# Patient Record
Sex: MALE | Race: BLACK OR AFRICAN AMERICAN | NOT HISPANIC OR LATINO | Employment: FULL TIME | URBAN - METROPOLITAN AREA
[De-identification: names, ages, dates, MRNs, and addresses within clinical notes are randomized per-mention and may not be internally consistent; named-entity substitution may affect disease eponyms.]

---

## 2023-09-07 LAB
ALANINE AMINOTRANSFERASE (SGPT) (U/L) IN SER/PLAS: 18 U/L (ref 10–52)
ALBUMIN (G/DL) IN SER/PLAS: 4.1 G/DL (ref 3.4–5)
ALKALINE PHOSPHATASE (U/L) IN SER/PLAS: 53 U/L (ref 33–120)
ANION GAP IN SER/PLAS: 9 MMOL/L (ref 10–20)
ASPARTATE AMINOTRANSFERASE (SGOT) (U/L) IN SER/PLAS: 26 U/L (ref 9–39)
BASOPHILS (10*3/UL) IN BLOOD BY AUTOMATED COUNT: 0.02 X10E9/L (ref 0–0.1)
BASOPHILS/100 LEUKOCYTES IN BLOOD BY AUTOMATED COUNT: 0.7 % (ref 0–2)
BILIRUBIN TOTAL (MG/DL) IN SER/PLAS: 0.6 MG/DL (ref 0–1.2)
CALCIUM (MG/DL) IN SER/PLAS: 9.5 MG/DL (ref 8.6–10.3)
CARBON DIOXIDE, TOTAL (MMOL/L) IN SER/PLAS: 30 MMOL/L (ref 21–32)
CHLORIDE (MMOL/L) IN SER/PLAS: 105 MMOL/L (ref 98–107)
CHOLESTEROL (MG/DL) IN SER/PLAS: 178 MG/DL (ref 0–199)
CHOLESTEROL IN HDL (MG/DL) IN SER/PLAS: 53.6 MG/DL
CHOLESTEROL/HDL RATIO: 3.3
CREATININE (MG/DL) IN SER/PLAS: 1.13 MG/DL (ref 0.5–1.3)
EOSINOPHILS (10*3/UL) IN BLOOD BY AUTOMATED COUNT: 0.03 X10E9/L (ref 0–0.7)
EOSINOPHILS/100 LEUKOCYTES IN BLOOD BY AUTOMATED COUNT: 1.1 % (ref 0–6)
ERYTHROCYTE DISTRIBUTION WIDTH (RATIO) BY AUTOMATED COUNT: 14.2 % (ref 11.5–14.5)
ERYTHROCYTE MEAN CORPUSCULAR HEMOGLOBIN CONCENTRATION (G/DL) BY AUTOMATED: 33.3 G/DL (ref 32–36)
ERYTHROCYTE MEAN CORPUSCULAR VOLUME (FL) BY AUTOMATED COUNT: 85 FL (ref 80–100)
ERYTHROCYTES (10*6/UL) IN BLOOD BY AUTOMATED COUNT: 5.06 X10E12/L (ref 4.5–5.9)
GFR MALE: 84 ML/MIN/1.73M2
GLUCOSE (MG/DL) IN SER/PLAS: 81 MG/DL (ref 74–99)
HEMATOCRIT (%) IN BLOOD BY AUTOMATED COUNT: 43 % (ref 41–52)
HEMOGLOBIN (G/DL) IN BLOOD: 14.3 G/DL (ref 13.5–17.5)
IMMATURE GRANULOCYTES/100 LEUKOCYTES IN BLOOD BY AUTOMATED COUNT: 0.4 % (ref 0–0.9)
LDL: 114 MG/DL (ref 0–99)
LEUKOCYTES (10*3/UL) IN BLOOD BY AUTOMATED COUNT: 2.8 X10E9/L (ref 4.4–11.3)
LYMPHOCYTES (10*3/UL) IN BLOOD BY AUTOMATED COUNT: 1.36 X10E9/L (ref 1.2–4.8)
LYMPHOCYTES/100 LEUKOCYTES IN BLOOD BY AUTOMATED COUNT: 48.1 % (ref 13–44)
MONOCYTES (10*3/UL) IN BLOOD BY AUTOMATED COUNT: 0.27 X10E9/L (ref 0.1–1)
MONOCYTES/100 LEUKOCYTES IN BLOOD BY AUTOMATED COUNT: 9.5 % (ref 2–10)
NEUTROPHILS (10*3/UL) IN BLOOD BY AUTOMATED COUNT: 1.14 X10E9/L (ref 1.2–7.7)
NEUTROPHILS/100 LEUKOCYTES IN BLOOD BY AUTOMATED COUNT: 40.2 % (ref 40–80)
PLATELETS (10*3/UL) IN BLOOD AUTOMATED COUNT: 224 X10E9/L (ref 150–450)
POTASSIUM (MMOL/L) IN SER/PLAS: 3.9 MMOL/L (ref 3.5–5.3)
PROTEIN TOTAL: 6.9 G/DL (ref 6.4–8.2)
SODIUM (MMOL/L) IN SER/PLAS: 140 MMOL/L (ref 136–145)
TRIGLYCERIDE (MG/DL) IN SER/PLAS: 50 MG/DL (ref 0–149)
UREA NITROGEN (MG/DL) IN SER/PLAS: 11 MG/DL (ref 6–23)
VLDL: 10 MG/DL (ref 0–40)

## 2023-11-17 ENCOUNTER — HOSPITAL ENCOUNTER (EMERGENCY)
Facility: HOSPITAL | Age: 38
Discharge: HOME | End: 2023-11-17
Attending: STUDENT IN AN ORGANIZED HEALTH CARE EDUCATION/TRAINING PROGRAM
Payer: COMMERCIAL

## 2023-11-17 ENCOUNTER — APPOINTMENT (OUTPATIENT)
Dept: CARDIOLOGY | Facility: HOSPITAL | Age: 38
End: 2023-11-17
Payer: COMMERCIAL

## 2023-11-17 VITALS
DIASTOLIC BLOOD PRESSURE: 72 MMHG | OXYGEN SATURATION: 100 % | WEIGHT: 256 LBS | SYSTOLIC BLOOD PRESSURE: 127 MMHG | BODY MASS INDEX: 35.7 KG/M2 | HEART RATE: 66 BPM | RESPIRATION RATE: 16 BRPM | TEMPERATURE: 97.9 F

## 2023-11-17 DIAGNOSIS — R22.41 LOCALIZED SWELLING OF RIGHT LOWER LEG: Primary | ICD-10-CM

## 2023-11-17 DIAGNOSIS — M79.604 PAIN IN RIGHT LEG: ICD-10-CM

## 2023-11-17 PROCEDURE — 99284 EMERGENCY DEPT VISIT MOD MDM: CPT | Mod: 25

## 2023-11-17 PROCEDURE — 99284 EMERGENCY DEPT VISIT MOD MDM: CPT | Performed by: STUDENT IN AN ORGANIZED HEALTH CARE EDUCATION/TRAINING PROGRAM

## 2023-11-17 PROCEDURE — 93971 EXTREMITY STUDY: CPT

## 2023-11-17 PROCEDURE — 99285 EMERGENCY DEPT VISIT HI MDM: CPT | Mod: 25 | Performed by: STUDENT IN AN ORGANIZED HEALTH CARE EDUCATION/TRAINING PROGRAM

## 2023-11-17 PROCEDURE — 93971 EXTREMITY STUDY: CPT | Performed by: INTERNAL MEDICINE

## 2023-11-17 ASSESSMENT — LIFESTYLE VARIABLES
HAVE PEOPLE ANNOYED YOU BY CRITICIZING YOUR DRINKING: NO
HAVE YOU EVER FELT YOU SHOULD CUT DOWN ON YOUR DRINKING: NO
REASON UNABLE TO ASSESS: NO
EVER FELT BAD OR GUILTY ABOUT YOUR DRINKING: NO
EVER HAD A DRINK FIRST THING IN THE MORNING TO STEADY YOUR NERVES TO GET RID OF A HANGOVER: NO

## 2023-11-17 ASSESSMENT — PAIN SCALES - GENERAL
PAINLEVEL_OUTOF10: 0 - NO PAIN
PAINLEVEL_OUTOF10: 0 - NO PAIN

## 2023-11-17 ASSESSMENT — COLUMBIA-SUICIDE SEVERITY RATING SCALE - C-SSRS
2. HAVE YOU ACTUALLY HAD ANY THOUGHTS OF KILLING YOURSELF?: NO
6. HAVE YOU EVER DONE ANYTHING, STARTED TO DO ANYTHING, OR PREPARED TO DO ANYTHING TO END YOUR LIFE?: NO
1. IN THE PAST MONTH, HAVE YOU WISHED YOU WERE DEAD OR WISHED YOU COULD GO TO SLEEP AND NOT WAKE UP?: NO

## 2023-11-17 ASSESSMENT — PAIN - FUNCTIONAL ASSESSMENT
PAIN_FUNCTIONAL_ASSESSMENT: 0-10
PAIN_FUNCTIONAL_ASSESSMENT: 0-10

## 2023-11-17 ASSESSMENT — PAIN DESCRIPTION - DESCRIPTORS: DESCRIPTORS: ACHING;OTHER (COMMENT)

## 2023-11-17 NOTE — DISCHARGE INSTRUCTIONS
Please follow up with your primary care physician within 1-2 days.  If you have increasing swelling, numbness/tingling, pain, redness, fevers, or other concerning symptoms, please seek immediate medical attention and come back into the ER.    If you have a return or worsening of symptoms, please seek immediate medical attention.

## 2023-11-17 NOTE — ED PROVIDER NOTES
EMERGENCY DEPARTMENT ENCOUNTER      Pt Name: Aviva Martinez  MRN: [id number]  Birthdate 1985  Date of evaluation: 11/17/2023  Provider: Tena Ramos DO    CHIEF COMPLAINT       Chief Complaint   Patient presents with    Leg Pain         HISTORY OF PRESENT ILLNESS    38-year-old male with no significant past medical history presents to the emergency department with right-sided leg pain and swelling that he noticed beginning about 4 days ago.  Initially had a very sharp pain in the posterior portion of his calf, but he had no injuries, no fevers, and no other issues.  He is not weak and has no numbness in the foot or toes.  He has no foot drop.  Likely swollen.  He denies chest pain, difficulty breathing, has had no fevers, and no nausea or vomiting.  Presented to urgent care, he sent him to the ER for DVT rule out.  He is -American, but has no history of thalassemias or sickle cell disease, although all 3 of his children have sickle cell trait. He is unsure if that trait comes from him or their mother.  He states that he has had prolonged periods of sitting, but denies long car travel or plane travel, denies recent immobilization.          Nursing Notes were reviewed.    PAST MEDICAL HISTORY     Past Medical History:   Diagnosis Date    Other conditions influencing health status     No significant past medical history         SURGICAL HISTORY       Past Surgical History:   Procedure Laterality Date    OTHER SURGICAL HISTORY  06/13/2022    No history of surgery         CURRENT MEDICATIONS     There are no discharge medications for this patient.      ALLERGIES     Patient has no known allergies.    FAMILY HISTORY     No family history on file.       SOCIAL HISTORY       Social History     Socioeconomic History    Marital status: Single     Spouse name: Not on file    Number of children: Not on file    Years of education: Not on file    Highest education level: Not on file   Occupational History    Not on  file   Tobacco Use    Smoking status: Not on file    Smokeless tobacco: Not on file   Substance and Sexual Activity    Alcohol use: Not on file    Drug use: Not on file    Sexual activity: Not on file   Other Topics Concern    Not on file   Social History Narrative    Not on file     Social Determinants of Health     Financial Resource Strain: Not on file   Food Insecurity: Not on file   Transportation Needs: Not on file   Physical Activity: Not on file   Stress: Not on file   Social Connections: Not on file   Intimate Partner Violence: Not on file   Housing Stability: Not on file       SCREENINGS                        PHYSICAL EXAM    (up to 7 for level 4, 8 or more for level 5)     ED Triage Vitals [11/17/23 1523]   Temp Heart Rate Resp BP   36.6 °C (97.9 °F) 68 18 (!) 140/96      SpO2 Temp Source Heart Rate Source Patient Position   100 % Temporal Monitor Sitting      BP Location FiO2 (%)     Right arm --       Physical Exam  Vitals and nursing note reviewed.   Constitutional:       General: He is not in acute distress.     Appearance: He is well-developed.   HENT:      Head: Normocephalic and atraumatic.   Eyes:      Conjunctiva/sclera: Conjunctivae normal.   Cardiovascular:      Rate and Rhythm: Normal rate and regular rhythm.      Heart sounds: No murmur heard.  Pulmonary:      Effort: Pulmonary effort is normal. No respiratory distress.      Breath sounds: Normal breath sounds.   Abdominal:      Palpations: Abdomen is soft.      Tenderness: There is no abdominal tenderness.   Musculoskeletal:      Cervical back: Neck supple.      Right lower leg: Edema (Below the knee) present.      Comments: Mild point tenderness in posterior medial calf   Skin:     General: Skin is warm and dry.      Capillary Refill: Capillary refill takes less than 2 seconds.   Neurological:      Mental Status: He is alert.   Psychiatric:         Mood and Affect: Mood normal.          DIAGNOSTIC RESULTS     LABS:  Labs Reviewed - No data  to display    All other labs were within normal range or not returned as of this dictation.    Imaging  Lower extremity venous duplex right   Final Result           Procedures  Procedures     EMERGENCY DEPARTMENT COURSE/MDM:     Diagnoses as of 11/18/23 1851   Localized swelling of right lower leg        Medical Decision Making  Unilateral edema below the knee in a 38-year-old male, ordered duplex of the right lower extremity.  Duplex shows no acute DVTs in the right lower extremity, or left proximal deep veins.  Discussed these findings with the patient and he was discharged in stable condition.  He was told that if he should have continued pain, redness, or other concerning symptoms, to seek immediate medical attention and come back into the ER.    I have reviewed this case with the ED attending physician, and the attending agrees with the plan. Patient or family was counselled regarding labs, imaging, likely diagnosis, and plan. All questions were answered.     Marlee Cleaning DO  PGY-4, emergency medicine    The above documentation was completed with the use of speech recognition software. It may contain dictation errors secondary to limitations of the software.        Patient and or family in agreement and understanding of treatment plan.  All questions answered.      I reviewed the case with the attending ED physician. The attending ED physician agrees with the plan. Patient and/or patient´s representative was counseled regarding labs, imaging, likely diagnosis, and plan. All questions were answered.    ED Medications administered this visit:  Medications - No data to display    New Prescriptions from this visit:  There are no discharge medications for this patient.      Follow-up:  Medhat Watson MD  [id number]  [facility] [address] [phone]    Schedule an appointment as soon as possible for a visit           Final Impression:   1. Localized swelling of right lower leg    2. Pain in right  leg          (Please note that portions of this note were completed with a voice recognition program.  Efforts were made to edit the dictations but occasionally words are mis-transcribed.)     Marlee lCeaning DO  Resident  11/18/23 1154    The patient was seen by the resident/fellow.  I have personally performed a substantive portion of the encounter.  I have seen and examined the patient; agree with the workup, evaluation, MDM, management and diagnosis.  The care plan has been discussed with the resident; I have reviewed the resident’s note and agree with the documented findings.           Tena Ramos DO  11/18/23 4782

## 2023-11-17 NOTE — ED TRIAGE NOTES
Patient was seen at [facility] urgent care. Patient was sent to the ER for evaluation r/o blood clot in the right leg. Pt denies injury at this time

## 2023-12-08 ENCOUNTER — OFFICE VISIT (OUTPATIENT)
Dept: PRIMARY CARE | Facility: CLINIC | Age: 38
End: 2023-12-08
Payer: COMMERCIAL

## 2023-12-08 VITALS
BODY MASS INDEX: 36.96 KG/M2 | SYSTOLIC BLOOD PRESSURE: 118 MMHG | HEIGHT: 71 IN | DIASTOLIC BLOOD PRESSURE: 80 MMHG | TEMPERATURE: 98.1 F | HEART RATE: 96 BPM | WEIGHT: 264 LBS

## 2023-12-08 DIAGNOSIS — R60.0 EDEMA OF RIGHT LOWER EXTREMITY: Primary | ICD-10-CM

## 2023-12-08 DIAGNOSIS — R03.0 ELEVATED BLOOD PRESSURE READING: ICD-10-CM

## 2023-12-08 PROBLEM — R05.9 COUGH: Status: ACTIVE | Noted: 2023-12-08

## 2023-12-08 PROBLEM — E78.9 BORDERLINE HIGH CHOLESTEROL: Status: ACTIVE | Noted: 2023-12-08

## 2023-12-08 PROBLEM — Z20.822 EXPOSURE TO COVID-19 VIRUS: Status: ACTIVE | Noted: 2023-12-08

## 2023-12-08 PROBLEM — Z86.16 HISTORY OF COVID-19: Status: ACTIVE | Noted: 2023-12-08

## 2023-12-08 PROBLEM — N62 GYNECOMASTIA, MALE: Status: ACTIVE | Noted: 2023-12-08

## 2023-12-08 PROBLEM — N52.9 ERECTILE DYSFUNCTION: Status: ACTIVE | Noted: 2023-12-08

## 2023-12-08 PROBLEM — R53.83 FATIGUE: Status: ACTIVE | Noted: 2023-12-08

## 2023-12-08 PROBLEM — R09.89 SINUS SYMPTOM: Status: ACTIVE | Noted: 2023-12-08

## 2023-12-08 PROCEDURE — 3008F BODY MASS INDEX DOCD: CPT | Performed by: INTERNAL MEDICINE

## 2023-12-08 PROCEDURE — 1036F TOBACCO NON-USER: CPT | Performed by: INTERNAL MEDICINE

## 2023-12-08 PROCEDURE — 93000 ELECTROCARDIOGRAM COMPLETE: CPT | Performed by: INTERNAL MEDICINE

## 2023-12-08 PROCEDURE — 99214 OFFICE O/P EST MOD 30 MIN: CPT | Performed by: INTERNAL MEDICINE

## 2023-12-08 RX ORDER — PANTOPRAZOLE SODIUM 40 MG/1
1 TABLET, DELAYED RELEASE ORAL DAILY
COMMUNITY
Start: 2022-07-11 | End: 2024-01-30 | Stop reason: ALTCHOICE

## 2023-12-08 RX ORDER — FLUTICASONE PROPIONATE 50 MCG
2 SPRAY, SUSPENSION (ML) NASAL DAILY
COMMUNITY
Start: 2022-07-11

## 2023-12-08 NOTE — PROGRESS NOTES
Assessment/Plan   Problem List Items Addressed This Visit       Elevated blood pressure reading     resolved         Relevant Orders    Transthoracic Echo (TTE) Complete    Edema of right lower extremity - Primary    Relevant Orders    Transthoracic Echo (TTE) Complete    Referral to Vascular Medicine    BMI 36.0-36.9,adult    Relevant Orders    Transthoracic Echo (TTE) Complete   Follow-up after the specialist opinion and testing is completed  Cause of edema is uncertain  Vascular physician opinion  Echo  Conservative measures leg elevation compression stockings discussed  It may well be venous in competence or venous stasis  Additional testing may be required depending upon the progress    Subjective   Patient ID: Aviva Martinez is a 38 y.o. male who presents for Foot Swelling (Right foot. /Did go to Urgent care they recommended him to have an MRI. /Still swelling).    Past Surgical History:   Procedure Laterality Date    OTHER SURGICAL HISTORY  06/13/2022    No history of surgery      Family History   Problem Relation Name Age of Onset    Glaucoma Mother      Hypertension Mother      No Known Problems Father        Social History     Socioeconomic History    Marital status: Single     Spouse name: Not on file    Number of children: Not on file    Years of education: Not on file    Highest education level: Not on file   Occupational History    Not on file   Tobacco Use    Smoking status: Never    Smokeless tobacco: Never   Substance and Sexual Activity    Alcohol use: Yes    Drug use: Never    Sexual activity: Not on file   Other Topics Concern    Not on file   Social History Narrative    Not on file     Social Determinants of Health     Financial Resource Strain: Not on file   Food Insecurity: Not on file   Transportation Needs: Not on file   Physical Activity: Not on file   Stress: Not on file   Social Connections: Not on file   Intimate Partner Violence: Not on file   Housing Stability: Not on file      Patient  "has no known allergies.   Current Outpatient Medications   Medication Sig Dispense Refill    fluticasone (Flonase) 50 mcg/actuation nasal spray Administer 2 sprays into affected nostril(s) once daily.      pantoprazole (ProtoNix) 40 mg EC tablet Take 1 tablet (40 mg) by mouth once daily.       No current facility-administered medications for this visit.      Vitals:    12/08/23 0909   BP: 118/80   Pulse: 96   Temp: 36.7 °C (98.1 °F)   Weight: 120 kg (264 lb)   Height: 1.803 m (5' 11\")      Problem List Items Addressed This Visit       Elevated blood pressure reading     resolved         Relevant Orders    Transthoracic Echo (TTE) Complete    Edema of right lower extremity - Primary    Relevant Orders    Transthoracic Echo (TTE) Complete    Referral to Vascular Medicine    BMI 36.0-36.9,adult    Relevant Orders    Transthoracic Echo (TTE) Complete      Orders Placed This Encounter   Procedures    Referral to Vascular Medicine     Standing Status:   Future     Standing Expiration Date:   6/8/2024     Referral Priority:   Routine     Referral Type:   Consultation     Referral Reason:   Specialty Services Required     Number of Visits Requested:   1    Transthoracic Echo (TTE) Complete     Standing Status:   Future     Standing Expiration Date:   12/8/2025     Order Specific Question:   Reason for exam:     Answer:   edema lower extremity uncertain cause     Order Specific Question:   Possible 3D echo?     Answer:   No     Order Specific Question:   Possible strain echo?     Answer:   No     Order Specific Question:   Where is your preferred location to perform this study?     Answer:   First Available        HPI  This is a 38-year-old gentleman who came to be seen because of his concern of right lower extremity swelling  When he noticed it he went to urgent care who sent him to the ER where he had venous duplex done and there was no clot and subsequently sent home with conservative measurement with advised to be " "followed up in the office or be seen back if there is any concern  He had no chest pain no palpitation no nausea vomiting diarrhea no passing out no syncope  ROS systemic inquiry otherwise has been negative  Past medical history reviewed  Social and family history reviewed  Allergies and medications reviewed  Recent labs reviewed  Vital signs reviewed    PHYSICAL EXAM  HEENT exam is negative  JVD negative  Heart sounds regular no murmur  Chest clear  Abdomen soft nontender no masses  Neuro awake and alert bilateral symmetrical nonfocal  Right lower extremity mildly edematous  Distal pulses very able to be felt  Cough nontender  EKG done which shows normal sinus rhythm normal EKG      No results found for: \"PR1\", \"BMPR1A\", \"CMPLAS\", \"XY7SPNSL\", \"KPSAT\"   Lab Results   Component Value Date    CHOL 178 09/07/2023    CHHDL 3.3 09/07/2023                "

## 2023-12-21 ENCOUNTER — HOSPITAL ENCOUNTER (OUTPATIENT)
Dept: CARDIOLOGY | Facility: CLINIC | Age: 38
Discharge: HOME | End: 2023-12-21
Payer: COMMERCIAL

## 2023-12-21 VITALS
HEIGHT: 71 IN | DIASTOLIC BLOOD PRESSURE: 82 MMHG | BODY MASS INDEX: 36.96 KG/M2 | WEIGHT: 264 LBS | SYSTOLIC BLOOD PRESSURE: 120 MMHG

## 2023-12-21 DIAGNOSIS — R60.0 EDEMA OF RIGHT LOWER EXTREMITY: ICD-10-CM

## 2023-12-21 DIAGNOSIS — R03.0 ELEVATED BLOOD PRESSURE READING: ICD-10-CM

## 2023-12-21 LAB
AORTIC VALVE MEAN GRADIENT: 4
AORTIC VALVE PEAK VELOCITY: 1.31
AV PEAK GRADIENT: 6.9
AVA (PEAK VEL): 2.95
AVA (VTI): 2.55
EJECTION FRACTION APICAL 4 CHAMBER: 61.1
LEFT VENTRICLE INTERNAL DIMENSION DIASTOLE: 5.57 (ref 3.5–6)
LEFT VENTRICULAR OUTFLOW TRACT DIAMETER: 2
MITRAL VALVE E/A RATIO: 0.77
MITRAL VALVE E/E' RATIO: 5.3

## 2023-12-21 PROCEDURE — 93356 MYOCRD STRAIN IMG SPCKL TRCK: CPT | Performed by: INTERNAL MEDICINE

## 2023-12-21 PROCEDURE — 93356 MYOCRD STRAIN IMG SPCKL TRCK: CPT

## 2023-12-21 PROCEDURE — 93306 TTE W/DOPPLER COMPLETE: CPT | Performed by: INTERNAL MEDICINE

## 2024-01-30 ENCOUNTER — OFFICE VISIT (OUTPATIENT)
Dept: PRIMARY CARE | Facility: CLINIC | Age: 39
End: 2024-01-30
Payer: COMMERCIAL

## 2024-01-30 VITALS
SYSTOLIC BLOOD PRESSURE: 136 MMHG | HEART RATE: 61 BPM | BODY MASS INDEX: 36.46 KG/M2 | HEIGHT: 71 IN | WEIGHT: 260.4 LBS | DIASTOLIC BLOOD PRESSURE: 84 MMHG

## 2024-01-30 DIAGNOSIS — I89.0 LYMPH EDEMA: ICD-10-CM

## 2024-01-30 DIAGNOSIS — M79.89 SWELLING OF RIGHT FOOT: ICD-10-CM

## 2024-01-30 DIAGNOSIS — R60.0 EDEMA OF RIGHT LOWER EXTREMITY: Primary | ICD-10-CM

## 2024-01-30 DIAGNOSIS — I87.321 IDIOPATHIC CHRONIC VENOUS HYPERTENSION OF RIGHT LEG WITH INFLAMMATION: ICD-10-CM

## 2024-01-30 PROCEDURE — 1036F TOBACCO NON-USER: CPT | Performed by: INTERNAL MEDICINE

## 2024-01-30 PROCEDURE — 3008F BODY MASS INDEX DOCD: CPT | Performed by: INTERNAL MEDICINE

## 2024-01-30 PROCEDURE — 99214 OFFICE O/P EST MOD 30 MIN: CPT | Performed by: INTERNAL MEDICINE

## 2024-01-30 NOTE — PROGRESS NOTES
Chief Complaints:  New consult for leg symptoms referred by Dr. Watson      HPI:  39 years old male who is generally healthy and active originally from [place] and in [place] about [place] came for a consultation because of his right leg swelling which started about 3 months ago.  Patient believes it came somewhat quickly and he went to urgent care center and was sent to ER where he had a deep scan done which was negative for DVT.  Since it was persisting he went to see his PCP and after evaluation of his heart he was advised to see me for a consultation.    He has family history of leg swellings with his mother but he does not know whether it is actually varicose veins.    When he is going up and [facility] he has seen elephantiasis/ Filariasis and is negative place.    He drinks only about 2-3 drinks or less per week.      Bilateral Leg Symptoms:  Current symptoms include right leg discomfort, swelling, cramps throughout the day was in the evenings but he still have about 3 to 4 /10 in the mornings.  The severity is moderate.  Aggravating factors include prolonged sitting/standing, walking.  Alleviating factors include leg elevation.  Activities of Daily Living The patient's symptoms are worse later in the day,   The patient reports that the symptoms interfere with day to day activities.          ROS:  Respiratory:  No shortness of breath.  Denies cough, sinus congestion    Cardiovascular:    No chest pain.  No symptoms suggestive of claudication.  No cyanosis.  Palpitations, denies.    Neurologic:    No tingling/Numbness.  No loss of strength.    Social History:  No tobacco Use:    Current Outpatient Medications on File Prior to Visit   Medication Sig Dispense Refill    fluticasone (Flonase) 50 mcg/actuation nasal spray Administer 2 sprays into affected nostril(s) once daily.      [DISCONTINUED] pantoprazole (ProtoNix) 40 mg EC tablet Take 1 tablet (40 mg) by mouth once daily.       No current facility-administered  "medications on file prior to visit.        No Known Allergies     Examination:    Visit Vitals  /84   Pulse 61   Ht 1.803 m (5' 11\")   Wt 118 kg (260 lb 6.4 oz)   BMI 36.32 kg/m²   Smoking Status Never   BSA 2.43 m²        Normal-built, well-nourished  with no apparent distress. Alert oriented  Skin:  Normal turgor.  No rash.  Head:  Normocephalic, atraumatic.  Eyes:  Pupils are equal, round,.  No pallor of conjunctivae.  Wearing a mask  Neck:  Supple.  No JVD.  No carotid bruit.  No thyromegaly. No cervical lymphadenopathy.  No clubbing  Chest:  Vesicular breathing. Bilaterally good air entry.  No wheezing.  No crackles.  Heart:  Regular rate and rhythm.  S1, S2 positive.  No murmur.  Abdomen:  Soft and nontender.  Bowel sounds are positive.  No organomegaly.  Extremities: Has no visible varicosities.  Stemmer's sign is positive in the right foot.  Detailed leg exam below.  Bilaterally 2+ dorsalis pedis pulses.  No calf tenderness. Homans sign is negative.  Neuro Exam: No focal signs. Gait is normal.    Venous Exam:  Varicose veins in left calf absent  Varicose veins in left thigh absent  Varicose veins in right calf absent  Varicose veins in right thigh absent  Reticular veins in left calf absent  Reticular veins in left thigh absent  Reticular veins in right calf absent  Reticular veins in right thigh absent  Telangiectasias in left calf absent  Telangiectasias in left thigh absent  Telangiectasias in right calf absent  Telangiectasias in right thigh absent  Right leg 1+ edema present  Left leg edema absent  Hyperpigmentation in left leg absent  Hyperpigmentation in right leg absent  Lipodermatosclerosis in right leg absent  Lipodermatosclerosis in left leg absent  Dermatitis in left leg absent  Dermatitis in right leg absent  Corona phlebectatica in left leg absent).  Corona phlebectatica in right leg absent  Atrophe carmen in left leg absent  Atrophe carmen in right leg absent  Ulcer(s) in left leg " absent  Ulcer(s) in right leg absent    CEAP Classification  Right leg CEAP C 3S Ep  Left leg CEAP C 1A     Assessment:        Plan     1.  Right leg swelling clinically has predominant features of lymphedema and may have some venous hypertension  Start graduated thigh high compression stockings 30 - 40 mm Hg during the wakeup/ day time. Rx GCS today  -Begin 6 weeks trial of conservative therapy with GCS. Trial begins today  -Schedule for B/L DUS mapping and treatment planning as discussed. Patient Educated with: Varicose  Veins and hand out was given to the patient.    Discussions    1.  Right leg leg swellings.    Patient's right leg swelling has predominant features of LYMPHEDEMA as per the clinical exam including the presence of stemmer's sign. Discussed the diagnosis in detail. Advised patient to continue to wear compression stockings mainly when she is standing or sitting. Other options which could help discussed are weight loss, regular aerobic exercise, keeping the legs elevated, local hygiene and some OTC as Horse Chest nut seed extract.  Lymph edema Physical therapy and a specialized treatment by lymphedema therapist is recommended and the referral was given to the patient.  At this time we will hold off the horse [place] extract and will follow-up after the detailed ultrasound evaluation to decide about oral medications.      Patient has s some ymptoms of chronic venous insufficiency which collaborates with patient's examination as noted above. During this visit, we had a detailed discussion about the pathophysiology of the venous disease and the conservative managements.  We discussed the genetic factors involved in the chronic venous insufficiency and also emphasize the environmental factors such as obesity, lifestyle which could contributes to the worsening of the chronic venous insufficiency. The patient will continue to have the regular activities including walking. The patient also will do all the  other conservative management including graduated compression stockings 30 to 40 mmHg thigh-high during the daytime as we prescribed.   We also discussed in details about the importance of complete treatment of the pathophysiology starting from the venous reflux if it is progressing from the deep junction by endovenous ablation of large truncal superficial veins and then treating the large refluxing tributaries with ultrasound-guided foam sclerotherapy to improve the symptoms and venous return. Handouts were given and patient verbalizes the understanding of our discussions.  The patient will follow up with us  for a detailed ultrasound evaluation which will give us the information about the abnormally refluxing superficial veins. After the study the abnormal venous system will be illustrated in the map form.  The patient will return  for re- evaluation (after insurance required conservative treatment trial) and discussions of the ultrasound evaluation results to decide about any further interventions, which are appropriate to relieve symptoms and prevent future complications from chronic vein disease.    If it is determined patient's venous reflux is minimum or not contributing to patient's leg and the foot swelling patient may need further investigation for his lymphedema including may be blood test to exclude Filariasis.  I advised the patient to follow-up with the PCP and discussed this and will coordinate secondary causes investigations with the PCP as appropriate.    Patient Education  RECOMMENDATIONS FOR BETTER LEG CARE  REGULAR EXERCISE  Walking, running, aerobics, swimming, elliptical machine, or biking for 30 minutes, 5 days per week will help reduce aching, pain and tiredness of your legs.  ELEVATE YOUR LEGS  Elevating your legs, heels slightly above chest level for at least 10 minutes, once or twice daily may diminish aching and swelling.  MOVE YOUR LEGS FREQUENTLY  Flexing your ankles will pump blood out  "of your legs like walking does.  Repeat this every 10 minutes while standing or sitting and try to walk for at least 2 minutes every 1/2 hour.  AVOID WEARING HEELS  Wearing high heels interferes with the normal pumping action that occurs when you walk and may lead to aching and cramping of the legs.  MAINTAIN PROPER WEIGHT  Even moderate weight loss may reduce aching in the legs due to varicose veins and diminish the rate at which spider veins develop.  WEAR SUPPORT HOSE  Available at pharmacies and medical supply stores.  There are many brands to choose from.  Lighter support hose are available at department stores.  However, it is best to wear stockings that are \"graduated\".  This will most efficiently improve your vein function.  Moderate Strength: 20-30 mm Hg   Heavy Strength: 30-40 mm Hg (prescription required)  Very Heavy Strength: 40-50 mm Hg (prescription required)  SYMPTOM CONTROL WITH MEDICATIONS  Non-steroidal analgesics (NSAIDS) have been useful in controlling symptoms and reducing inflammation.  If you do not have a contraindication, allergy or intolerance to these medications, small doses may be used to alleviate symptoms.  Advice your practitioner if you have experienced prior adverse effects to these medications as alternative analgesics may be used.  For additional information - go to  www.phlebology.org and open the patient information tab    Counseling.  The patient was counseled regarding instructions for management, risk factor reductions, prognosis, patient and family education, impressions, risks and benefits of treatment options and importance of compliance with treatment. total time of encounter was 55 minutes and 43 minutes was spent counseling.       "

## 2024-02-07 ENCOUNTER — APPOINTMENT (OUTPATIENT)
Dept: PRIMARY CARE | Facility: CLINIC | Age: 39
End: 2024-02-07
Payer: COMMERCIAL

## 2024-02-08 ENCOUNTER — OFFICE VISIT (OUTPATIENT)
Dept: PRIMARY CARE | Facility: CLINIC | Age: 39
End: 2024-02-08
Payer: COMMERCIAL

## 2024-02-08 DIAGNOSIS — I87.321 IDIOPATHIC CHRONIC VENOUS HYPERTENSION OF RIGHT LEG WITH INFLAMMATION: Primary | ICD-10-CM

## 2024-02-08 PROCEDURE — 93970 EXTREMITY STUDY: CPT | Performed by: INTERNAL MEDICINE

## 2024-02-08 PROCEDURE — 3008F BODY MASS INDEX DOCD: CPT | Performed by: INTERNAL MEDICINE

## 2024-02-08 PROCEDURE — 1036F TOBACCO NON-USER: CPT | Performed by: INTERNAL MEDICINE

## 2024-02-08 NOTE — Clinical Note
As per the detailed reflux study of the legs patient may benefit from DIANE of  RT GSV, followed by adjunctive Ultrasound guided noncompounded foam sclerotherapy of refluxing remnant of truncal vein X 1 right leg.  Patient also may need adjunctive image guided foam sclerotherapy X 1  in right leg.

## 2024-02-14 ENCOUNTER — OFFICE VISIT (OUTPATIENT)
Dept: PRIMARY CARE | Facility: CLINIC | Age: 39
End: 2024-02-14
Payer: COMMERCIAL

## 2024-02-14 VITALS
HEIGHT: 71 IN | TEMPERATURE: 98.2 F | SYSTOLIC BLOOD PRESSURE: 122 MMHG | DIASTOLIC BLOOD PRESSURE: 84 MMHG | HEART RATE: 70 BPM | WEIGHT: 260 LBS | BODY MASS INDEX: 36.4 KG/M2

## 2024-02-14 VITALS
HEART RATE: 65 BPM | DIASTOLIC BLOOD PRESSURE: 79 MMHG | SYSTOLIC BLOOD PRESSURE: 122 MMHG | WEIGHT: 260 LBS | BODY MASS INDEX: 36.4 KG/M2 | HEIGHT: 71 IN

## 2024-02-14 DIAGNOSIS — R60.0 EDEMA OF RIGHT LOWER EXTREMITY: Primary | ICD-10-CM

## 2024-02-14 DIAGNOSIS — I87.321 IDIOPATHIC CHRONIC VENOUS HYPERTENSION OF RIGHT LEG WITH INFLAMMATION: Primary | ICD-10-CM

## 2024-02-14 DIAGNOSIS — I87.321 IDIOPATHIC CHRONIC VENOUS HYPERTENSION OF RIGHT LEG WITH INFLAMMATION: ICD-10-CM

## 2024-02-14 DIAGNOSIS — R60.0 EDEMA OF RIGHT LOWER EXTREMITY: ICD-10-CM

## 2024-02-14 DIAGNOSIS — I89.0 LYMPH EDEMA: ICD-10-CM

## 2024-02-14 PROCEDURE — 1036F TOBACCO NON-USER: CPT | Performed by: INTERNAL MEDICINE

## 2024-02-14 PROCEDURE — 3008F BODY MASS INDEX DOCD: CPT | Performed by: INTERNAL MEDICINE

## 2024-02-14 PROCEDURE — 99213 OFFICE O/P EST LOW 20 MIN: CPT | Performed by: INTERNAL MEDICINE

## 2024-02-14 PROCEDURE — 99214 OFFICE O/P EST MOD 30 MIN: CPT | Performed by: INTERNAL MEDICINE

## 2024-02-14 ASSESSMENT — PATIENT HEALTH QUESTIONNAIRE - PHQ9
2. FEELING DOWN, DEPRESSED OR HOPELESS: NOT AT ALL
SUM OF ALL RESPONSES TO PHQ9 QUESTIONS 1 AND 2: 0
1. LITTLE INTEREST OR PLEASURE IN DOING THINGS: NOT AT ALL

## 2024-02-14 NOTE — PROGRESS NOTES
Assessment/Plan   Problem List Items Addressed This Visit       Edema of right lower extremity - Primary    Idiopathic chronic venous hypertension of right leg with inflammation    Lymph edema     Condition of lymphedema or right lower swelling is unchanged  Review of the vein specialist Dr. Dotson note was done including his studies  Study has shown evidence of chronic venous hypertension and may be treated with ablative therapy I defer it to Dr. Hernandez  He has a follow-up appointment with him the plan of management as per Dr. Hernandez in this regard  His labs were reviewed and shared with the patient  Follow-up with me as a as needed basis  Subjective   Patient ID: Aviva Martinez is a 39 y.o. male who presents for Follow-up (Patient present for follow up on right foot pain. ).    Past Surgical History:   Procedure Laterality Date    OTHER SURGICAL HISTORY  06/13/2022    No history of surgery      Family History   Problem Relation Name Age of Onset    Glaucoma Mother      Hypertension Mother      No Known Problems Father        Social History     Socioeconomic History    Marital status:      Spouse name: Not on file    Number of children: Not on file    Years of education: Not on file    Highest education level: Not on file   Occupational History    Not on file   Tobacco Use    Smoking status: Never     Passive exposure: Never    Smokeless tobacco: Never   Vaping Use    Vaping Use: Never used   Substance and Sexual Activity    Alcohol use: Yes    Drug use: Never    Sexual activity: Not on file   Other Topics Concern    Not on file   Social History Narrative    Not on file     Social Determinants of Health     Financial Resource Strain: Not on file   Food Insecurity: Not on file   Transportation Needs: Not on file   Physical Activity: Not on file   Stress: Not on file   Social Connections: Not on file   Intimate Partner Violence: Not on file   Housing Stability: Not on file      Patient has no known allergies.  "  Current Outpatient Medications   Medication Sig Dispense Refill    fluticasone (Flonase) 50 mcg/actuation nasal spray Administer 2 sprays into affected nostril(s) once daily.       No current facility-administered medications for this visit.      Vitals:    02/14/24 0849   BP: 122/84   BP Location: Left arm   Patient Position: Sitting   Pulse: 70   Temp: 36.8 °C (98.2 °F)   Weight: 118 kg (260 lb)   Height: 1.803 m (5' 11\")      Problem List Items Addressed This Visit       Edema of right lower extremity - Primary    Idiopathic chronic venous hypertension of right leg with inflammation    Lymph edema      No orders of the defined types were placed in this encounter.       HPI  Came for review  Continues to have right lower extremity edema especially when he is sitting long  Review of the note of Dr. Hernandez and his studies was done  He has a follow-up appointment with him today  ROS otherwise negative  Past medical history reviewed  Social and family history reviewed  Allergies and medications reviewed  Recent labs reviewed  Vital signs reviewed    PHYSICAL EXAM  Unchanged  Labs and echo reviewed  Ultrasound examination reviewed     Results for orders placed or performed during the hospital encounter of 12/21/23   Transthoracic Echo (TTE) Complete   Result Value Ref Range    AV mn grad 4.0     AV pk marya 1.31     LVOT diam 2.00     MV E/A ratio 0.77     MV avg E/e' ratio 5.30     LVIDd 5.57     Aortic Valve Area by Continuity of Peak Velocity 2.95     AV pk grad 6.9     Aortic Valve Area by Continuity of VTI 2.55     LV A4C EF 61.1            No results found for: \"PR1\", \"BMPR1A\", \"CMPLAS\", \"WF4KNPNQ\", \"KPSAT\"   Lab Results   Component Value Date    CHOL 178 09/07/2023    CHHDL 3.3 09/07/2023                "

## 2024-02-14 NOTE — PROGRESS NOTES
Chief Complaints:  Seen for follow-up after detailed ultrasound evaluation.    HPI:    39 years old male who has right leg swelling which has been giving him symptoms mostly at the end of the day came for a follow-up visit.  He is not able to get the compression stockings because he was told by the [employer] [facility] [place] he should do the lymphedema therapy to start the compression stockings.  But he is doing all the other conservative management including the lifestyle modification we discussed in detail.  Bilateral Leg Symptoms:  Current symptoms include Leg pain, swelling, cramps of the right leg.  The severity is moderate.  Aggravating factors include prolonged sitting/standing, walking.  Alleviating factors include leg elevation.  The patient reports that the symptoms interfere with sleep, interfere with walking.      Symptoms:  bulging veins, dilated veins, discolored veins, leg pain, leg swelling and muscle cramps. The patient is currently experiencing symptoms. Symptoms are located mainly in the right leg.  The patient describes the pain as aching, heavy, throbbing and stinging. Onset was gradual for the last few months. The symptoms occur almost constantly. The episodes occur daily. She describes this as moderate in severity and worsening. Exacerbating factors:  leg dependency, prolonged sitting and prolonged standing. Relieving factors:  elevation, exercises and support stockings.  Current treatment includes leg elevation, lifestyle modifications including frequent walking. By report, there is fair compliance with treatment, fair tolerance of treatment and poor symptom control. Initial presentation included bulging veins, dilated veins and leg pain. Since diagnosis the disease has been worsening. Recently, the disease has been worsening. Disease complications:  chronic edema. Pertinent medical history:  no deep venous thrombosis, no hypercoagulable state, no pulmonary embolism and no thrombophlebitis. Risk  "factors:  family history of varicose veins.  Patient was previously evaluated by me 2 weeks ago. Presenting symptoms included leg swelling, leg pain, muscle cramps and cramps.  Past evaluation has included duplex ultrasound. Past treatment has included compression stockings, Life style modification, leg elevation, image guided foam sclerotherapy and endovenous laser treatment.     ROS:  Respiratory:  No shortness of breath.  Denies cough, sinus congestion     Cardiovascular:     No chest pain.  No symptoms suggestive of claudication.  No cyanosis.  Palpitations, denies.     Neurologic:     No tingling/Numbness.  No loss of strength.     Social History:  No tobacco Use:    Current Outpatient Medications on File Prior to Visit   Medication Sig Dispense Refill    fluticasone (Flonase) 50 mcg/actuation nasal spray Administer 2 sprays into affected nostril(s) once daily.       No current facility-administered medications on file prior to visit.        No Known Allergies     Examination:    Visit Vitals  /79   Pulse 65   Ht 1.803 m (5' 11\")   Wt 118 kg (260 lb)   BMI 36.26 kg/m²   Smoking Status Never   BSA 2.43 m²        Normal-built, well-nourished  with no apparent distress. Alert oriented  Skin:  Normal turgor.  No rash.  Head:  Normocephalic, atraumatic.  Eyes:  Pupils are equal, round,.  No pallor of conjunctivae.  Wearing a mask  Neck:  Supple.  No JVD.  No carotid bruit.  No thyromegaly. No cervical lymphadenopathy.  No clubbing  Chest:  Vesicular breathing. Bilaterally good air entry.  No wheezing.  No crackles.  Heart:  Regular rate and rhythm.  S1, S2 positive.  No murmur.  Abdomen:  Soft and nontender.  Bowel sounds are positive.  No organomegaly.  Extremities: Has no visible varicosities.  Stemmer's sign is positive in the right foot.  Detailed leg exam below.  Bilaterally 2+ dorsalis pedis pulses.  No calf tenderness. Homans sign is negative.  Neuro Exam: No focal signs. Gait is normal.     Venous " Exam:  Varicose veins in left calf absent  Varicose veins in left thigh absent  Varicose veins in right calf absent  Varicose veins in right thigh absent  Reticular veins in left calf absent  Reticular veins in left thigh absent  Reticular veins in right calf absent  Reticular veins in right thigh absent  Telangiectasias in left calf absent  Telangiectasias in left thigh absent  Telangiectasias in right calf absent  Telangiectasias in right thigh absent  Right leg 1+ edema present  Left leg edema absent  Hyperpigmentation in left leg absent  Hyperpigmentation in right leg absent  Lipodermatosclerosis in right leg absent  Lipodermatosclerosis in left leg absent  Dermatitis in left leg absent  Dermatitis in right leg absent  Corona phlebectatica in left leg absent).  Corona phlebectatica in right leg absent  Atrophe carmen in left leg absent  Atrophe carmen in right leg absent  Ulcer(s) in left leg absent  Ulcer(s) in right leg absent     CEAP Classification  Right leg CEAP C 3S Ep  Left leg CEAP C 1A          Diagnosis/ Problems    Assessment:    Problem List Items Addressed This Visit       Idiopathic chronic venous hypertension of right leg with inflammation - Primary    Relevant Orders    Compression Stockings 30-40 mmHg    Lymph edema    Relevant Orders    Compression Stockings 30-40 mmHg          Plan   Patient has not started lymphedema therapy and all the graduated compression stockings.  He was told by the [employer] [facility] [place] that he should have the lymphedema therapy before he started the graduated compression stockings.  I had a lengthy discussion with the patient about the importance of graduated compression stockings benefits with chronic venous insufficiency and he will start the graduated compression stocking trial as soon as possible.      Chronic venous insufficiency of bilateral lower extremities with other complications  Patient would benefit from DIANE of  RT GSV followed by adjunctive Ultrasound  guided noncompounded foam sclerotherapy of refluxing remnant of truncal vein X 1 right leg.  Patient also may need adjunctive image guided foam sclerotherapy X 1  in right leg.    He will be reevaluated in about 6 weeks time to decide about any intervention depending on his symptoms after his lymphedema therapy and also wearing the graduated compression stockings regularly.    Discussions   Patient's detail ultrasound evaluation including the illustration of the refluxing superficial venous system of both legs were reviewed in detail with the patient.  Patient has both truncal vein and also tributaries reflux identified giving rise to the symptoms.  Because of the progressive nature of venous disease and patient's continued symptoms in spite of doing the conservative management including lifestyle modification and wearing the graduated compression stockings further options were discussed with the patient. Closure of the truncal veins by EVLA and ultrasound-guided foam sclerotherapy of large refluxing veins was discussed. Risks, benefits, goals and alternatives and reasonable expectations were discussed for at least 20 min. All risks were discussed, including, but not limited to hyperpigmentation, skin necrosis, recurrence/progression of the disease/symptoms, infection, DVT, SVT and all other possible complications. All questions were answered to patient satisfaction. Patients understands and wishes to proceed. Handouts were given to the patient regarding the procedures and also the explanation including the measures taken to prevent the possible complications  which includes wearing the graduated compression stockings immediately after procedure and overnight that day and also walking every hour about 10 minutes during her waking time on the day of the procedure.  She will continue to wear the graduated compression stockings during the daytime for minimum 2 weeks and also he will be active including no traveling or  prolonged nonambulatory status for 2 weeks to prevent DVT and other complications.      Counseling.  The patient was counseled regarding instructions for management, risk factor reductions, prognosis, patient and family education, impressions, risks and benefits of treatment options and importance of compliance with treatment. Total time of encounter was 36 minutes and 29 minutes was spent counseling.    I discussed the patient management plan with the PCP today.

## 2024-02-22 ENCOUNTER — EVALUATION (OUTPATIENT)
Dept: OCCUPATIONAL THERAPY | Facility: CLINIC | Age: 39
End: 2024-02-22
Payer: COMMERCIAL

## 2024-02-22 DIAGNOSIS — M79.89 SWELLING OF RIGHT FOOT: ICD-10-CM

## 2024-02-22 DIAGNOSIS — M62.81 GENERALIZED MUSCLE WEAKNESS: ICD-10-CM

## 2024-02-22 DIAGNOSIS — R60.0 EDEMA OF RIGHT LOWER EXTREMITY: ICD-10-CM

## 2024-02-22 DIAGNOSIS — I87.321 IDIOPATHIC CHRONIC VENOUS HYPERTENSION OF RIGHT LEG WITH INFLAMMATION: ICD-10-CM

## 2024-02-22 DIAGNOSIS — Q82.0 HEREDITARY LYMPHEDEMA: ICD-10-CM

## 2024-02-22 DIAGNOSIS — M25.671 ANKLE STIFF, RIGHT: Primary | ICD-10-CM

## 2024-02-22 DIAGNOSIS — M79.604 LOWER EXTREMITY PAIN, RIGHT: ICD-10-CM

## 2024-02-22 DIAGNOSIS — I89.0 LYMPH EDEMA: ICD-10-CM

## 2024-02-22 PROCEDURE — 97166 OT EVAL MOD COMPLEX 45 MIN: CPT | Mod: GO

## 2024-02-22 ASSESSMENT — ENCOUNTER SYMPTOMS
LOSS OF SENSATION IN FEET: 0
OCCASIONAL FEELINGS OF UNSTEADINESS: 0
DEPRESSION: 0

## 2024-02-22 NOTE — PROGRESS NOTES
Occupational Therapy    Occupational Therapy Evaluation    Name: Aviva Martinez  MRN: [id number]  : 1985  Date: 2024  Visit #1  NO AUTH    Time Calculation  Start Time: 1010  Stop Time: 1055  Time Calculation (min): 45 min    Assessment:  Pt presents to occupational therapy today with right leg/foot lymphedema, heaviness/tightness/pain/achiness, joint stiffness, weakness, impacting  functional mobility,  ADLS, IADLS, work and leisure activities.   Standardized testing and measures administered today, including LLIS, reveal that the patient has multiple impairments in body structures and functions, activity limitations, and participation restrictions.   The patient has personal factors and comorbidities that may serve as barriers affecting the plan of care, including vascular insufficiency, upcoming vascular procedures, busy work schedule.  Skilled OT services are warranted in order to realize measurable change in the above outcome measures and achieve improvements in the patient's functional status and individual goals. The patient verbalized understanding and is in agreement with all goals and plan of care.    Clinical Presentation: evolving with changing characteristics   Level of Complexity: moderate   Problems To Be Addressed: decreased ADL performance, decreased IADL performance, decreased play/leisure performance, decreased knowledge of precautions, decreased knowledge of HEP, edema, pain, decreased ROM/joint mobility, decreased strength, impaired sensation/sensibility and lymphedema.      Plan:  By discharge pt will achieve the following goals:     -Demonstrate decreased swelling and softened tissue texture in RLE/foot upon visual inspection and palpation to increase safe functional mobility, ADLS. IADLS, and leisure activities. Monitor LLE.  -Decrease circumferential measurements right lower extremity by 0.5-3.5 cm.  -Demonstrate increased knowledge with lymphedema skin care precautions to reduce  the risk of infection and exacerbation.  -Demonstrate independence with the self manual lymph drainage (MLD) to enhance lymphatic flow and decongestion of trunk, right/both lower extremities. Assess lymphedema pump use.  -Demonstrate independence with self bandaging/compression techniques and independent understanding of the principles and theory of lymphatic compression. Support compression strategies for vascular procedures.  -Be fit with and demonstrate good tolerance to bilateral lower extremity compression garment (to determine style, mmHg) when the patient is stable, at maximal decongestion.  -Demonstrate independence with donning/doffing garment and adherence to wear schedule, adaptive techniques as needed.  -Improve LLIS score by 10-21 points by discharge.  -Decrease pain, tightness right lower extremity.  -Improve right/bilateral LE functional ROM and strength as needed for safe functional mobility.  -Demonstrate independence with home exercise program and compliance with lymphedema exercise precautions.      Intervention plan include: vasopneumatic device , ADLs, compression bandaging , edema control , education/instruction , garment measuring/fitting , home program , IADLs, manual lymphatic drainage , manual therapy , therapeutic activities and therapeutic exercises.   Frequency and duration: 1-2 time(s) a week, for 8-12 weeks.   Potential to achieve rehab goals is good.     Plan of care was developed with input and agreement by the patient.      Subjective   Current Problem:  1. Ankle stiff, right        2. Swelling of right foot  Referral to Occupational Therapy      3. Idiopathic chronic venous hypertension of right leg with inflammation  Referral to Occupational Therapy      4. Edema of right lower extremity  Referral to Occupational Therapy      5. Lymph edema  Referral to Occupational Therapy      6. Lower extremity pain, right        7. Generalized muscle weakness        8. Hereditary lymphedema           General:    Lymphedema History   History of present illness. Pt presents to OT with right leg/foot lymphedema, heaviness/tightness/pain/achiness, joint stiffness, weakness, impacting  functional mobility,  ADLS, IADLS, work and leisure activities.   Pt reports right leg/foot swelling started Nov 2023, reports he has been wearing compression socks though not containing swelling.  Pt has venous insufficiency, working with Dr Hernandez for vascular procedures, testing. Pt prescribed thigh high 30-40 mmHg stocking right leg.  Medical history: N/A  Hand Dominance: Right   Affected body part(s) right lower extremity     Living Environment, Social Support and Patient Characteristics:   Pt lives with his wife and 3 sons in 2 story house, bed, bathroom second floor.  Pt walks without difficulty, manages stairs.  Pt manages bathing, dressing, shares home care tasks.  Pt works from home, computer.  Pt notes right leg swelling increases throughout the day, especially when sitting at desk for extended time.  Pt reports he is working out, treadmill, light weights, as able.  Pt drives.    Precautions:  STEADI = 0Precautions  STEADI Fall Risk Score (The score of 4 or more indicates an increased risk of falling): 0    Pain Assessment:   R leg heavy, achy, tight, worsens with prolonged sitting.  Pt elevates right leg     Objective      ROM:   R ankle tight, swollen, stiff.   Strength:   R leg fatigues per pt.   Sensation:   Right Lower Extremity:  intact  Left Lower Extremity:  intact  Lower Extremity (Skin Appearance/Condition and Girth):   Dryness  RLE  Fibrotic edema right lower leg, ankle, foot   Pitting edema   Hyperkeratosis harder creases right ankle, tops of toes   Hyperpigmentation right lower leg, ankles foot   + Stemmer Sign right 2nd toes   Additional Information:   Fullness right lower leg, firm tissue texture.     Lower Extremity Girth Measurements:      RLE cm  Superior border of patella (SBP)  46.4  10 cm above SBP  -  10cm below SBP  40.2  20cm below SBP  46.0  30cm below SBP 39.5  40cm below SBP 31.5  Ankle lobule 30.6  Ankle 27.5  Forefoot  27.2    LLE cm  Superior border of patella (SBP)  45.1  10 cm above SBP -  10cm below SBP 40.0  20cm below SBP 48.7  30cm below SBP 38.3  40cm below SBP  29.6  Ankle lobule  30.9  Ankle 26.3  Forefoot  26.6    Treatment  0 minutes  Evaluation  45 minutes        Outcome Measures:  LLIS = 26

## 2024-02-25 PROBLEM — M62.81 GENERALIZED MUSCLE WEAKNESS: Status: ACTIVE | Noted: 2024-02-25

## 2024-02-25 PROBLEM — Q82.0 HEREDITARY LYMPHEDEMA: Status: ACTIVE | Noted: 2024-02-25

## 2024-03-14 ENCOUNTER — TREATMENT (OUTPATIENT)
Dept: OCCUPATIONAL THERAPY | Facility: CLINIC | Age: 39
End: 2024-03-14
Payer: COMMERCIAL

## 2024-03-14 DIAGNOSIS — I89.0 LYMPH EDEMA: ICD-10-CM

## 2024-03-14 DIAGNOSIS — M79.604 LOWER EXTREMITY PAIN, RIGHT: ICD-10-CM

## 2024-03-14 DIAGNOSIS — M62.81 GENERALIZED MUSCLE WEAKNESS: ICD-10-CM

## 2024-03-14 DIAGNOSIS — M25.671 ANKLE STIFF, RIGHT: Primary | ICD-10-CM

## 2024-03-14 DIAGNOSIS — Q82.0 HEREDITARY LYMPHEDEMA: ICD-10-CM

## 2024-03-14 PROCEDURE — 97535 SELF CARE MNGMENT TRAINING: CPT | Mod: GO | Performed by: OCCUPATIONAL THERAPIST

## 2024-03-14 ASSESSMENT — ACTIVITIES OF DAILY LIVING (ADL): HOME_MANAGEMENT_TIME_ENTRY: 54

## 2024-03-14 NOTE — PROGRESS NOTES
Occupational Therapy    Occupational Therapy Evaluation    Name: Aviva Martinez  MRN: [id number]  : 1985  Date: 2024  Visit #2         Assessment:  OT initiated Manual Lymph Drainage (MLD) home program.  Pt expressed carryover.  Will further assess.     Plan:  Continue POC as established in evaluation     Subjective   Current Problem:  No diagnosis found.    Pt reports he has been exercising and wearing his 30-40mmHg thigh high compression     Objective      ROM:   R ankle tight, swollen, stiff.   Strength:   R leg fatigues per pt.   Sensation:   Right Lower Extremity:  intact  Left Lower Extremity:  intact  Lower Extremity (Skin Appearance/Condition and Girth):   Dryness  RLE  Fibrotic edema right lower leg, ankle, foot   Pitting edema   Hyperkeratosis harder creases right ankle, tops of toes   Hyperpigmentation right lower leg, ankles foot   + Stemmer Sign right 2nd toes   Additional Information:   Fullness right lower leg, firm tissue texture.     Lower Extremity Girth Measurements:      No measurements on today's date     Treatment      Self Care/ ADL: 54    OT initiated manual lymph drainage (MLD) home program for R trunk and RLE. OT provided rationale for MLD; OT utilized visual aids of the lymphatic system to reinforce education. OT provided patient with step by step personalized MLD sequence. OT educated pt on correct hand techniques and sequencing for self MLD.  Pt with good teach back of education provided.    OT applied short stretch bandages to RLE from foot to knee  -4” Tricofix base layer, 1-8cm and 1-10cm short stretch bandage. - Comfortable fit achieved.  OT educated pt on post bandaging precautions and bandaging instructions, handout issued. -Pt expressed teach back of education

## 2024-03-21 ENCOUNTER — TREATMENT (OUTPATIENT)
Dept: OCCUPATIONAL THERAPY | Facility: CLINIC | Age: 39
End: 2024-03-21
Payer: COMMERCIAL

## 2024-03-21 DIAGNOSIS — Q82.0 HEREDITARY LYMPHEDEMA: Primary | ICD-10-CM

## 2024-03-21 DIAGNOSIS — M79.604 LOWER EXTREMITY PAIN, RIGHT: ICD-10-CM

## 2024-03-21 DIAGNOSIS — M62.81 GENERALIZED MUSCLE WEAKNESS: ICD-10-CM

## 2024-03-21 DIAGNOSIS — M25.671 ANKLE STIFF, RIGHT: ICD-10-CM

## 2024-03-21 PROCEDURE — 97535 SELF CARE MNGMENT TRAINING: CPT | Mod: GO,CO

## 2024-03-21 PROCEDURE — 97140 MANUAL THERAPY 1/> REGIONS: CPT | Mod: GO,CO

## 2024-03-21 ASSESSMENT — ACTIVITIES OF DAILY LIVING (ADL): HOME_MANAGEMENT_TIME_ENTRY: 34

## 2024-03-21 NOTE — PROGRESS NOTES
Occupational Therapy    Occupational Therapy Evaluation    Name: Aviva Martinez  MRN: [id number]  : 1985  Date: 3/21/2024  Visit #3    Time Calculation  Start Time: 857  Stop Time: 957  Time Calculation (min): 60 min    Assessment:  Pt appears to tolerate treatment well, increased ease of ankle flexion after pump use. Pt states his ankle and foot feel much less swollen after using lymphapress. Decreased measurements after pump use.    Plan:  Continue occupational therapy for lymphedema CDT to promote decreased swelling B LE and increase ease of functional mobility tasks.     Subjective   Pt states he has been wrapping with short stretch bandages. Pt notices his foot swelling with being seated during work but is diligent about getting up multiple times per work day from desk.      Current Problem:  1. Hereditary lymphedema        2. Generalized muscle weakness        3. Ankle stiff, right        4. Lower extremity pain, right                Objective      ROM:   R ankle tight, swollen, stiff.   Strength:   R leg fatigues per pt.   Sensation:   Right Lower Extremity:  intact  Left Lower Extremity:  intact  Lower Extremity (Skin Appearance/Condition and Girth):   Dryness  RLE  Fibrotic edema right lower leg, ankle, foot   Pitting edema   Hyperkeratosis harder creases right ankle, tops of toes   Hyperpigmentation right lower leg, ankles foot   + Stemmer Sign right 2nd toes   Additional Information:   Fullness right lower leg, firm tissue texture.     Lower Extremity Girth Measurements:      R LE    SBP: 44.1  10cm below SBP: 38.1  20cm below SBP: 44.0  30cm below SBP: 37.5  35cm below SBP: 32.0  40cm below SBP: 29.6 - 28.0  Ankle: 28.1 - 27.2  Ankle lobule: 31.9 - 30.0  Forefoot: 27.2 - 27.0    Treatment      Man Ther 25  FULLER applies lymphapress to R LE, set at 35mmHg. FULLER instructs pt in diaphragmatic breathing, FULLER provides initial steps to MLD. Pt requests pressure be turned up, FULLER turns pressure to  40mmHg. After pump use, visible decrease at ankle and dorsal side of right foot. Pt states it is easier to move toes, flex ankle.     Self Care/ ADL: 34    FULLER assesses skin, takes measurements.  Pt did not bring wraps today, FULLER applies tg fix size F from foot to below knee, instructs pt in wear and care in addition to wraps, pt verbalizes understanding.   FULLER educates pt on wear schedule of wraps, pt verbalizes understanding.     Home management:  -Pt continues using short stretch bandages as primary form of compression currently  -Pt maintains a low sodium diet  -Pt elevates legs as able throughout the day. Pt works from home and sets a timer to get up and move around every hour so he is not seated for a long period of time  -Pt weightlifts, cycles, and runs for exercise    Despite pt following home management, pt continues to have swelling in R LE. Pt would benefit from lymphatic pump at home for decreased swelling, increased ease of functional mobility and leisure tasks.

## 2024-03-26 ENCOUNTER — OFFICE VISIT (OUTPATIENT)
Dept: PRIMARY CARE | Facility: CLINIC | Age: 39
End: 2024-03-26
Payer: COMMERCIAL

## 2024-03-26 ENCOUNTER — APPOINTMENT (OUTPATIENT)
Dept: PRIMARY CARE | Facility: CLINIC | Age: 39
End: 2024-03-26
Payer: COMMERCIAL

## 2024-03-26 VITALS
WEIGHT: 259 LBS | BODY MASS INDEX: 36.26 KG/M2 | DIASTOLIC BLOOD PRESSURE: 77 MMHG | HEIGHT: 71 IN | HEART RATE: 62 BPM | SYSTOLIC BLOOD PRESSURE: 129 MMHG

## 2024-03-26 DIAGNOSIS — R29.818 SUSPECTED SLEEP APNEA: ICD-10-CM

## 2024-03-26 DIAGNOSIS — R60.0 EDEMA OF RIGHT LOWER EXTREMITY: ICD-10-CM

## 2024-03-26 DIAGNOSIS — I89.0 LYMPH EDEMA: ICD-10-CM

## 2024-03-26 DIAGNOSIS — I87.321 IDIOPATHIC CHRONIC VENOUS HYPERTENSION OF RIGHT LEG WITH INFLAMMATION: Primary | ICD-10-CM

## 2024-03-26 DIAGNOSIS — M79.89 SWELLING OF RIGHT FOOT: ICD-10-CM

## 2024-03-26 PROBLEM — Q82.0 HEREDITARY LYMPHEDEMA: Status: RESOLVED | Noted: 2024-02-25 | Resolved: 2024-03-26

## 2024-03-26 PROCEDURE — 3008F BODY MASS INDEX DOCD: CPT | Performed by: INTERNAL MEDICINE

## 2024-03-26 PROCEDURE — 1036F TOBACCO NON-USER: CPT | Performed by: INTERNAL MEDICINE

## 2024-03-26 PROCEDURE — 99214 OFFICE O/P EST MOD 30 MIN: CPT | Performed by: INTERNAL MEDICINE

## 2024-03-26 ASSESSMENT — LIFESTYLE VARIABLES
HOW OFTEN DO YOU HAVE SIX OR MORE DRINKS ON ONE OCCASION: NEVER
AUDIT-C TOTAL SCORE: 1
AUDIT TOTAL SCORE: 1
HOW OFTEN DO YOU HAVE A DRINK CONTAINING ALCOHOL: MONTHLY OR LESS
SKIP TO QUESTIONS 9-10: 1
HOW MANY STANDARD DRINKS CONTAINING ALCOHOL DO YOU HAVE ON A TYPICAL DAY: 1 OR 2
HAVE YOU OR SOMEONE ELSE BEEN INJURED AS A RESULT OF YOUR DRINKING: NO
HAS A RELATIVE, FRIEND, DOCTOR, OR ANOTHER HEALTH PROFESSIONAL EXPRESSED CONCERN ABOUT YOUR DRINKING OR SUGGESTED YOU CUT DOWN: NO

## 2024-03-26 NOTE — PROGRESS NOTES
Chief Complaints:  Seen for follow-up after his lymphedema therapy and the trial of compression stockings.    HPI:  39 years old male who is generally healthy developed right leg swellings with symptoms.  Patient has been doing the conservative management including thigh-high graduated compression stockings.  Because of the clinical evidence of lymphedema patient started on lymphedema therapy but he says it has not improved him much and he would like to proceed with venous procedures for his right leg where he has significant symptoms.    He does admits that he has snoring at nights.  He has never had any sleep study done.    He is very active.  He exercises regularly.  His leg related symptoms are noted below.      Symptoms:, leg pain, leg swelling and muscle cramps. The patient is currently experiencing symptoms. Symptoms are located on right leg.  The patient describes the pain as aching, heavy, throbbing and stinging. Onset was gradual More than 2-3 year(s) ago. The symptoms occur intermittently. The episodes occur daily. She describes this as moderate in severity and worsening. Exacerbating factors:  leg dependency, prolonged sitting and prolonged standing. Relieving factors:  elevation, exercises and support stockings. Associated symptoms: Leg swellings and lymphedema.. Current treatment includes leg elevation and graduated compression stockings and lymphedema therapy. By report, there is excellent compliance with treatment, fair tolerance of treatment and poor symptom control. Initial presentation included bulging veins, dilated veins and leg pain. Since diagnosis the disease has been worsening. Recently, the disease has been worsening. Disease complications:  chronic edema. Pertinent medical history:  no deep venous thrombosis, no hypercoagulable state, no pulmonary embolism and no thrombophlebitis. Risk factors:  family history of varicose veins.  Patient was previously evaluated by me 3 months ago.  "Presenting symptoms included bulging veins, dilated veins, leg pain, muscle cramps.. Past evaluation has included duplex ultrasound. Past treatment has included compression stockings, Life style modification, leg elevation, image guided foam sclerotherapy and endovenous laser treatment.     ROS:  Respiratory:  No shortness of breath.  No cough, sinus congestion    Cardiovascular:    No chest pain.  No symptoms of claudication.  No cyanosis.  No palpitations, denies.    Neurologic:    No tingling/Numbness.  No loss of strength.    Social History:  Tobacco Use:  None    Current Outpatient Medications on File Prior to Visit   Medication Sig Dispense Refill    fluticasone (Flonase) 50 mcg/actuation nasal spray Administer 2 sprays into affected nostril(s) once daily.       No current facility-administered medications on file prior to visit.        No Known Allergies     Examination:    Visit Vitals  /77   Pulse 62   Ht 1.803 m (5' 11\")   Wt 117 kg (259 lb)   BMI 36.12 kg/m²   Smoking Status Never   BSA 2.42 m²        Moderate obese, well-nourished  with no apparent distress. Alert oriented  Skin:  Normal turgor.  No rash.  Head:  Normocephalic, atraumatic.  Eyes:  Pupils are equal, round,.  No pallor of conjunctivae.   Neck:  Supple.  No JVD.  No carotid bruit.  No thyromegaly. No cervical lymphadenopathy.  No clubbing  Chest:  Vesicular breathing. Bilaterally good air entry.  No wheezing.  No crackles.  Heart:  Regular rate and rhythm.  S1, S2 positive.  No murmur.  Abdomen:  Soft and nontender.  Bowel sounds are positive.  No organomegaly.  Extremities: Has no visible varicosities.  Stemmer's sign is positive in the right foot.  Detailed leg exam below.  Bilaterally 2+ dorsalis pedis pulses.  No calf tenderness. Homans sign is negative.  Neuro Exam: No focal signs. Gait is normal.     Venous Exam:  Varicose veins in left calf absent  Varicose veins in left thigh absent  Varicose veins in right calf " absent  Varicose veins in right thigh absent  Reticular veins in left calf absent  Reticular veins in left thigh absent  Reticular veins in right calf absent  Reticular veins in right thigh absent  Telangiectasias in left calf absent  Telangiectasias in left thigh absent  Telangiectasias in right calf absent  Telangiectasias in right thigh absent  Right leg 1+ edema present  Left leg edema absent  Hyperpigmentation in left leg absent  Hyperpigmentation in right leg absent  Lipodermatosclerosis in right leg absent  Lipodermatosclerosis in left leg absent  Dermatitis in left leg absent  Dermatitis in right leg absent  Corona phlebectatica in left leg absent).  Corona phlebectatica in right leg absent  Atrophe carmen in left leg absent  Atrophe carmen in right leg absent  Ulcer(s) in left leg absent  Ulcer(s) in right leg absent     CEAP Classification  Right leg CEAP C 3S Ep  Left leg CEAP C 1A          Diagnosis/ Problems    Assessment:  Problem List Items Addressed This Visit       Edema of right lower extremity    Swelling of right foot    Idiopathic chronic venous hypertension of right leg with inflammation - Primary    Lymph edema    Suspected sleep apnea          Plan     Chronic venous insufficiency of right lower extremities with other complications   DIANE of  RT GSV, followed by adjunctive Ultrasound guided noncompounded foam sclerotherapy of refluxing remnant of truncal vein X 1 right leg.  Patient also may need adjunctive image guided foam sclerotherapy X 1  in right leg.     Discussions  Patient had conservative management including lymphedema therapy and he continues to have symptoms which are interfering with his day-to-day activities.  He would like to proceed with the procedures which we have discussed in the past.     Patient's detail ultrasound evaluation including the illustration of the refluxing superficial venous system of both legs were reviewed in detail with the patient.  Patient has both  truncal vein and also tributaries reflux identified giving rise to the symptoms.  Because of the progressive nature of venous disease and patient's continued symptoms in spite of doing the conservative management including lifestyle modification and wearing the graduated compression stockings further options were discussed with the patient. Closure of the truncal veins by EVLA and ultrasound-guided foam sclerotherapy of large refluxing veins was discussed. Risks, benefits, goals and alternatives and reasonable expectations were discussed for at least 20 min. All risks were discussed, including, but not limited to hyperpigmentation, skin necrosis, recurrence/progression of the disease/symptoms, infection, DVT, SVT and all other possible complications. All questions were answered to patient satisfaction. Patients understands and wishes to proceed. Handouts were given to the patient regarding the procedures and also the explanation including the measures taken to prevent the possible complications  which includes wearing the graduated compression stockings immediately after procedure and overnight that day and also walking every hour about 10 minutes during her waking time on the day of the procedure.  he will continue to wear the graduated compression stockings during the daytime for minimum 2 weeks and also she will be active including no traveling or prolonged nonambulatory status for 2 weeks to prevent DVT and other complications.    We discussed the recent documentation that the if the BMI is more than 40 the success of venous procedures are limited. I encouraged the patient that he should the try to improve his health by loosing weight if possible prior to the venous procedures. Given the patient's significant obesity with a BMI of 36.1 I advised the patient that she should work with the primary care physician and other appropriate consults to lose weight.    Patient has history of snoring.  As per today's  clinical evaluation patient has high risk of having sleep apnea.  We discussed the sleep apnea and the risk of exacerbating her venous insufficiency. Patient will talk to the primary care physician regarding this symptoms and whether needs a sleep study. If there is sleep study identified she understands importance of correcting it to improve and also prevent recurrence of venous insufficiency.    Counseling.  The patient was counseled regarding instructions for management, risk factor reductions, prognosis, patient and family education, impressions, risks and benefits of treatment options and importance of compliance with treatment. Total time of encounter was 29 minutes and 19 minutes was spent counseling.

## 2024-03-27 ENCOUNTER — TREATMENT (OUTPATIENT)
Dept: OCCUPATIONAL THERAPY | Facility: CLINIC | Age: 39
End: 2024-03-27
Payer: COMMERCIAL

## 2024-03-27 DIAGNOSIS — R60.0 EDEMA OF RIGHT LOWER EXTREMITY: Primary | ICD-10-CM

## 2024-03-27 DIAGNOSIS — M79.604 LOWER EXTREMITY PAIN, RIGHT: ICD-10-CM

## 2024-03-27 DIAGNOSIS — M62.81 GENERALIZED MUSCLE WEAKNESS: ICD-10-CM

## 2024-03-27 DIAGNOSIS — M25.671 ANKLE STIFF, RIGHT: ICD-10-CM

## 2024-03-27 PROCEDURE — 97535 SELF CARE MNGMENT TRAINING: CPT | Mod: GO,CO

## 2024-03-27 PROCEDURE — 97140 MANUAL THERAPY 1/> REGIONS: CPT | Mod: GO,CO

## 2024-03-27 ASSESSMENT — ACTIVITIES OF DAILY LIVING (ADL): HOME_MANAGEMENT_TIME_ENTRY: 21

## 2024-03-27 NOTE — PROGRESS NOTES
Occupational Therapy    Occupational Therapy Evaluation    Name: Aviva Martinez  MRN: [id number]  : 1985  Date: 3/27/2024  Visit #4    Time Calculation  Start Time: 0900  Stop Time: 951  Time Calculation (min): 51 min    Assessment:  Pt appears to tolerate treatment well, decreased measurements this date. Pt continues home program diligently, despite continuing home program pt continues to have swelling in distal R LE.     Plan:  Continue occupational therapy for lymphedema CDT to promote decreased swelling B LE and increase ease of functional mobility tasks.     Subjective   Pt states he continues wearing wraps, home program, swelling is not getting any worse.       Current Problem:  1. Edema of right lower extremity        2. Generalized muscle weakness        3. Lower extremity pain, right        4. Ankle stiff, right                Objective      ROM:   R ankle tight, swollen, stiff.   Strength:   R leg fatigues per pt.   Sensation:   Right Lower Extremity:  intact  Left Lower Extremity:  intact  Lower Extremity (Skin Appearance/Condition and Girth):   Dryness  RLE  Fibrotic edema right lower leg, ankle, foot   Pitting edema   Hyperkeratosis harder creases right ankle, tops of toes   Hyperpigmentation right lower leg, ankles foot   + Stemmer Sign right 2nd toes   Additional Information:   Fullness right lower leg, firm tissue texture.     Lower Extremity Girth Measurements:      R LE    SBP: 44.0  10cm below SBP: 38.0  20cm below SBP: 44.0  30cm below SBP: 36.5  35cm below SBP: 30.9  40cm below SBP: 28.2 - 27.5  Ankle: 26.2 - 26.1  Ankle lobule: 30.0 - 29.3  Forefoot: 26.4 - 26.7    Decreases in measurements this date    Treatment      Man Ther 30  FULLER applies lymphapress optimal to R LE, set at 340mmHg. FULLER instructs pt in diaphragmatic breathing, FULLER provides initial steps to MLD. After pump use, visible decrease at ankle and dorsal side of right foot. Pt states it is easier to move toes, flex ankle.      Self Care/ ADL: 21  Pt doffs wraps for treatment  FULLER assesses skin, takes measurements before and after pump use, decreases throughout.  FULLER wraps R LE from foot to below knee using tricofix liner, 1-8cm and 1-10cm short stretch bandage, heel uninvolved.    Home management:  -Pt continues using short stretch bandages as primary form of compression currently  -Pt maintains a low sodium diet  -Pt elevates legs as able throughout the day. Pt works from home and sets a timer to get up and move around every hour so he is not seated for a long period of time  -Pt weightlifts, cycles, and runs for exercise    Despite pt following home management, pt continues to have swelling in R LE. Pt would benefit from lymphatic pump at home for decreased swelling, increased ease of functional mobility and leisure tasks.

## 2024-04-02 ENCOUNTER — APPOINTMENT (OUTPATIENT)
Dept: OCCUPATIONAL THERAPY | Facility: CLINIC | Age: 39
End: 2024-04-02
Payer: COMMERCIAL

## 2024-05-15 ENCOUNTER — TELEPHONE (OUTPATIENT)
Dept: CARDIOLOGY | Facility: CLINIC | Age: 39
End: 2024-05-15
Payer: COMMERCIAL

## 2024-05-15 NOTE — TELEPHONE ENCOUNTER
5/15-VEIN CODES 97994, 85278, 70746 APPROVED AUTH# [id number] VALID 4/10--10/10/24. CODES 34700 AND 08692 NO AUTH REQ PER JANNET.

## 2024-05-20 ENCOUNTER — PROCEDURE VISIT (OUTPATIENT)
Dept: PRIMARY CARE | Facility: CLINIC | Age: 39
End: 2024-05-20
Payer: COMMERCIAL

## 2024-05-20 DIAGNOSIS — I87.321 IDIOPATHIC CHRONIC VENOUS HYPERTENSION OF RIGHT LEG WITH INFLAMMATION: Primary | ICD-10-CM

## 2024-05-20 DIAGNOSIS — I87.392 CHRONIC VENOUS HYPERTENSION (IDIOPATHIC) WITH OTHER COMPLICATIONS OF LEFT LOWER EXTREMITY: ICD-10-CM

## 2024-05-20 PROCEDURE — 36478 ENDOVENOUS LASER 1ST VEIN: CPT | Performed by: INTERNAL MEDICINE

## 2024-05-20 NOTE — PROGRESS NOTES
Endovascular Laser Venous System, RIGHT leg    Procedure: Endovenous Laser Ablation of the RIGHT Leg Great Saphenous Vein.    Performed by Adele Hernandez MD, FACP, [facility]    Sonographer: Aicha Gibbons RVT    Pre Procedure: The patient's vital signs were taken, and the patient signed the informed consent after reviewing the risks, benefits, and alternatives previously discussed with the patient by the physician. The patient was given the opportunity to ask any additional questions or voice any concerns. All questions/concerns were answered to the patient's satisfaction. The patient stated that they had no relevant medical or clinical changes since their last visit and examination. The patient's history was updated with current medications. The patient continues to have symptoms of venous disease. All laser safety precautions taken to protect the patient. There are no venous aneurysm nor are there significant vessel tortuosity that would prevent EVLT. The patient has no evidence of peripheral arterial disease .     Time out: Immediately prior to the procedure, the entire procedure team, along with the patient, verified the patient's identification, intended procedure, correct procedure site, and that all equipment and supplies were present at 10:55 AM .    Anesthesia: Subcutaneous injections of local anesthesia at the laser insertion site and tumescent needle insertion sites with 1% buffered Xylocaine for patient comfort, Local infiltration of tumescent anesthesia as described below.    Procedure Description: Informed written consent was obtained. A time out was performed prior to the procedure. The entire RIGHT lower extremity was prepared and draped to allow knee flexion in the sterile field. A 12 MHz duplex ultrasound probe draped in a sterile sleeve and covered with ultrasound transmission gel was introduced. The entire GreatSaphenous vein was mapped and notations were made of varicosity clusters and tortuous  segments. Measurements of the length of the RIGHT Greatsaphenous vein along with minimum and maximum diameters were noted. The total length was 26 cm from the entry point ABOVE the knee to  2 to 3 cm below the SaphenoFemoral Junction . The vein targeted for treatment was non aneurismal throughout and was not so tortuous that the procedure could not be safely or adequately performed.  ?  Using a 30gauge needle the entry site was anesthetized with 1% buffered lidocaine. The RIGHT  Great Saphenous vein was entered percutaneously  under ultrasound guidance with a micropuncture set (21gauge needle). A micro wire was inserted and the 21 gauge needle was removed. A small incision was made over the wire with a 18 Gauge needle. (A 4F micro set including a dilator and sheath were inserted over the micro wire and the wire and dilator were removed.) A spotlight OPS  sheath was placed over the wire allowing access to the vein. Guide wire was removed and the laser fiber was inserted through the sheet.  A 600micron, 1470 nm fiber was introduced and placed into position so that it extended beyond the sheath. The final position of the fiber was determined by ultrasound guidance and duplex imaging. Tumescent anesthetic was delivered by ultrasound guidance.  300 ml of 0.05% buffered Lidocaine in normal saline was delivered along the course of the Great Saphenous vein. A final positioning check was made. The energy source was turned on by means of a footpedal and the fiber and sheath were withdrawn at a slow rate. The total time of energy delivery was 156 seconds. The total energy delivered was 1092.1 J @ 7 W. Repeat duplex imaging showed no flow over the entire length of treated vein and the Saphenofemoral/ popliteal junction is completely compressible.    Angiodynamic Procedure Kit LOT # [id number]    After assuring hemostasis, the skin incision over the RIGHT Great saphenous vein was closed with steri strips. A Class II compression  stocking was placed on the treated leg. Discharge and postop instructions were reviewed and given to the patient. An ultrasound evaluation of the saphenofemoral junction will be performed within one week. All laser safety precautions taken for patient. The patient tolerated the procedure well.    Complications: None.    Post Treatment Assessment: In-office observation and ambulation occurred immediately after treatment. , Recommended medication:, Regular Tylenol every 6-8 hours as needed but if it does not relieve the pain could try ibuprofen (Advil, Motrin, others) tablets 3 times a day for days as needed after meals for 5 days

## 2024-05-22 ENCOUNTER — OFFICE VISIT (OUTPATIENT)
Dept: VASCULAR SURGERY | Facility: CLINIC | Age: 39
End: 2024-05-22
Payer: COMMERCIAL

## 2024-05-22 ENCOUNTER — OFFICE VISIT (OUTPATIENT)
Dept: PRIMARY CARE | Facility: CLINIC | Age: 39
End: 2024-05-22
Payer: COMMERCIAL

## 2024-05-22 VITALS — HEART RATE: 74 BPM | SYSTOLIC BLOOD PRESSURE: 134 MMHG | DIASTOLIC BLOOD PRESSURE: 91 MMHG

## 2024-05-22 DIAGNOSIS — I83.813 VARICOSE VEINS OF BILATERAL LOWER EXTREMITIES WITH PAIN: Primary | ICD-10-CM

## 2024-05-22 DIAGNOSIS — R60.0 EDEMA OF RIGHT LOWER EXTREMITY: ICD-10-CM

## 2024-05-22 DIAGNOSIS — I87.321 IDIOPATHIC CHRONIC VENOUS HYPERTENSION OF RIGHT LEG WITH INFLAMMATION: Primary | ICD-10-CM

## 2024-05-22 DIAGNOSIS — I87.321 IDIOPATHIC CHRONIC VENOUS HYPERTENSION OF RIGHT LEG WITH INFLAMMATION: ICD-10-CM

## 2024-05-22 PROCEDURE — 36465 NJX NONCMPND SCLRSNT 1 VEIN: CPT | Performed by: INTERNAL MEDICINE

## 2024-05-22 PROCEDURE — 3008F BODY MASS INDEX DOCD: CPT | Performed by: INTERNAL MEDICINE

## 2024-05-22 PROCEDURE — 93971 EXTREMITY STUDY: CPT | Performed by: INTERNAL MEDICINE

## 2024-05-22 NOTE — PROGRESS NOTES
Lower Extremity Deep Venous Duplex, Right    Sonographer: Aicha Gibbons RVT    Technique: Venous duplex ultrasound with color and spectral Doppler wave modalities, has been performed with the patient in supine position to determine the status of the deep venous system of the right lower extremities. The deep venous system is imaged at the right common femoral, femoral, popliteal, posterior tibial, peroneal and, as noted, gastrocenemius veins. The equipment used to perform the examination is the [employer] x300 duplex ultrasound machine utilizing 8-3 MHz transducers. Compression, augmentation and phasicity (as appropriate) were evaluated to rule-out venous thrombosis or obstruction. Spectral Doppler analysis and confirmatory images were documented.     Findings:  Common Femoral Vein (CFV) : The common femoral vein is, positive compress, positive augment, positive phasic.    Contralateral CFV: The contralateral vein is positive compress, positive augment, positive phasic.    Saphenofemoral Junction (SFJ): The saphenofemoral junction is positive compress.    Proximal Femoral Vein: The proximal femoral vein is positive compress.    Mid Femoral Vein: The mid femoral vein is positive compress.    Distal Femoral Vein: The distal femoral vein is positive compress.    Popliteal Vein: The popliteal vein is positive compress, positive augment, positive phasic.    Saphenopopliteal Junction (SPJ): The saphenopopliteal junction is positive compress.    Posterior Tibial Veins: The posterior tibial veins are positive compress.    Peroneal Veins: The peroneal veins are positive compress.    Gastrocnemius Veins: The gastrocnemius veins are positive compress.    Additional Findings: N/A    IMPRESSION: Normal deep venous system at the RIGHT lower extremity with no evidence of venous thrombosis or obstruction in the veins evaluated.

## 2024-05-22 NOTE — PROGRESS NOTES
Venous Duplex      Indications:  Limb pain  Leg Edema    Sonographer: Aicha Gibbons RVT    TECHNIQUE:  Venous Duplex ultrasound spectral Doppler wave modality was performed with the patient in the supine and upright positions to determine the status of the deep and superficial systems of the right lower extremity. The common femoral, femoral and popliteal veins of the deep venous system were imaged. The superficial system was imaged entirely to include, but was not limited to, the saphenous-femoral junction (SFJ) down the greater saphenous vein from the groin to the ankle, and the saphenous-popliteal junction (SPJ), if present, at the popliteal fossa down the small saphenous vein (SSV) to the ankle. As indicated, the cranial extensions of the SSV (CESSV), the anterior accessory GSV (AAGSV), and the posterior accessory GSV (PAGSV) were also evaluated, if present. All areas meeting the criteria for venous reflux (500 milliseconds or greater in the saphenous veins and principle branches, 350 milliseconds in perforators and 1000 milliseconds in the deep system) were confirmed with spectral Doppler analysis and confirmatory images were documented:     FINDINGS ARE THE FOLLOWING:    RIGHT LEG:  There is no evidence of thrombus in the interrogated segments of the deep or superficial venous system. There is no evidence of deep venous reflux.    GSV dimensions: 7.6mm junction   proximal segment is non compressible   mid segment is non compressible  4.1mm distal  There is >0.5 seconds of reflux of the distal Greater Saphenous Vein  The proximal to mid Greater Saphenous Vein is non compressible, consistent with previous treatment    SSV dimensions: 2.1mm junction  2.5mm mid  There is <0.5 seconds of reflux in the Small Saphenous Vein    There are multiple incompetent tributaries along the thigh and lower leg.    Trib1 dimensions: 3.6mm  Trib2 dimensions: 3.3mm  There is >0.5 seconds of reflux in the tributaries     RIGHT LEG  "CONCLUSION:     The right leg venous duplex interrogation shows that there is no evidence of deep vein thrombosis noted and no evidence superficial vein thrombosis noted.    There is no evidence of deep venous reflux in right leg    The patient's right Proximal and mid greater saphenous vein appears closed from previous procedure. The patient's right distal greater saphenous vein has reflux.    The patient's right small saphenous vein has no reflux.    There are multiple dilated torturous refluxing tributaries noted in the right upper ,  middle , and lower leg, measured ranging from 3.6   to   3.3 mm in diameter.    Varithena Administration Procedure Operative Notes :[id number]::\"Right leg    Performed by Adele Hernandez MD, [facility], [facility]    Sonographer: Performed by: Aicha Gibbons RVT    Pre Procedure: The need for sclerotherapy was based on the previous abnormal vein findings from a physical examination and duplex ultrasound evaluation with color flow, spectral Doppler of the saphenous venous system evaluated in a separate procedure today delete and ultrasound reports regarding the reflux is available in the chart. These refluxing or abnormal veins were treated using ultrasound-guided sclerotherapy injections to chemically ablate, or close, the affected veins. The goal of treatment is to improve the venous hemodynamics of this extremity and improve the patient's vein symptoms of vein disease. A 13-5 MHz linear array ultrasound transducer was used to identify the mapped veins. The needle was then strategically placed into the abnormal veins under ultrasound guidance. Administration of the sclerosing agent and the vessels' responses were observed by ultrasound. Duplex ultrasonography was used to diagnose the disease, confirm treatment success, and locate persistent/recurrent refluxing veins. Any additional abnormalities, e.g. deep venous involvement, were noted.    Provider's Assessment The patient returns today for follow-up " of the lower extremity. The patient is doing well. There are still some residual symptoms, as previously noted, including pain. Overall, the patient's symptoms have improved. There is no swelling noted. No infection. Duplex Ultrasound shows remaining refluxing tributaries, which will be treated today.     The patient was given an explanation of the protocol for the administration of Varithena. We went over the potential adverse reactions and what to do in the event that one or more occur.     Time out: Immediately prior to the procedure, the entire procedure team, along with the patient, verified the patient's identification, intended procedure, correct procedure site, and that all equipment and supplies were present. The patient signed the informed consent after reviewing the risks, benefits, and alternatives previously discussed with the patient by the physician. Time 10:45 AM.    Anesthesia: None.    After agreement, the patient was positioned on  the  examination  table  to  examine  and  measure  the  veins  requiring  treatment .    Procedure:     The patient was positioned horizontally on the procedure table, and the skin of lower extremity treatment area was prepped with 70% isopropyl alcohol. Under ultrasound guidance, the abnormal veins were accessed with 25 gauge percutaneous needles. Administration of the sclerosing agent and the vessels' responses were observed with ultrasound. The puncture/injection(s) was performed under ultrasound guidance    Distal GSV:   Varithena was administered at 1.0 cc per second with close observation under ultrasound in its course of the GSV. The vein was observed for spasm under ultrasound, once vein was in full spasm the administration of Varithena was stopped.    visible varicosities with significant reflux of the great saphenous vein and other truncal venous system which were identified by the ultrasound examination as previously were also injected with Varithena 0.5  cc/s with close observation and ultrasound till the refluxing tributaries and a complete spasm.    Treated Areas: thigh, calf and ankle, from posterior lateral, medial and anterior. Patient received a total of 7 ml of Varithena administered intravenously in 1 injections to sclerose selected veins.    After the administration of Varithena the patient was asked to dorsiflex the ankle to limit flow of foam into perforating veins. Once appropriate spasm had been confirmed in the treated veins, the access device was removed from the leg and light pressure was applied to the puncture site for hemostasis.    The common femoral and deep superficial veins were then evaluated for flow and compressibility prior to dressing placement. The lower extremity was kept elevated at 45-degree angle and a multilayer dressing was applied including an under layer, compression pad, and thigh high 30-40 mmHg compression stocking to the patient. The leg was lowered only after compression had been applied.    The patient ambulated 10 minutes under supervision and was without concerns at time of release. Post care instructions include walking, wearing compression 24 hours per day, taking off only to shower and then reapplying for two weeks.     Complications: None.    Post Treatment Assessment: The patient tolerated the procedure well. Discharge instructions were given to and discussed with the patient. The patient was instructed to phone for any unusual signs or symptoms. The patient understands and agrees. , In-office observation with elevation of the legs was utilized after treatment. , Graduated compression stockings were applied in the office. The patient is to wear them for: Overnight for a day and Daily for 14 day(s). The patient tolerated the procedure well. Discharge instructions were given to and discussed with the patient. The patient was instructed to phone for any unusual signs or symptoms. The patient understands and agrees. ,The  patient was instructed to take an anti-inflammatory medicine as needed and to follow up for duplex scan for deep venous system within 3 to 5 days.  Follow-up evaluation of the treatment is planned in  14  days.

## 2024-05-23 ENCOUNTER — OFFICE VISIT (OUTPATIENT)
Dept: VASCULAR SURGERY | Facility: CLINIC | Age: 39
End: 2024-05-23
Payer: COMMERCIAL

## 2024-05-23 DIAGNOSIS — I83.891 VARICOSE VEINS OF LEG WITH EDEMA, RIGHT: Primary | ICD-10-CM

## 2024-05-23 PROCEDURE — 3008F BODY MASS INDEX DOCD: CPT | Performed by: INTERNAL MEDICINE

## 2024-05-23 PROCEDURE — 93971 EXTREMITY STUDY: CPT | Performed by: INTERNAL MEDICINE

## 2024-05-23 NOTE — PROGRESS NOTES
/Lower Extremity Deep Venous Duplex, Right    Sonographer: Aicha Gibbons RVT    Technique: Venous duplex ultrasound with color and spectral Doppler wave modalities, has been performed with the patient in supine position to determine the status of the deep venous system of the right lower extremities. The deep venous system is imaged at the right common femoral, femoral, popliteal, posterior tibial, peroneal and, as noted, gastrocenemius veins. The equipment used to perform the examination is the [employer] x300 duplex ultrasound machine utilizing 8-3 MHz transducers. Compression, augmentation and phasicity (as appropriate) were evaluated to rule-out venous thrombosis or obstruction. Spectral Doppler analysis and confirmatory images were documented.     Findings:  Common Femoral Vein (CFV) : The common femoral vein is, positive compress, positive augment, positive phasic.    Contralateral CFV: The contralateral vein is positive compress, positive augment, positive phasic.    Saphenofemoral Junction (SFJ): The saphenofemoral junction is positive compress.    Proximal Femoral Vein: The proximal femoral vein is positive compress.    Mid Femoral Vein: The mid femoral vein is positive compress.    Distal Femoral Vein: The distal femoral vein is positive compress.    Popliteal Vein: The popliteal vein is positive compress, positive augment, positive phasic.    Saphenopopliteal Junction (SPJ): The saphenopopliteal junction is positive compress.    Posterior Tibial Veins: The posterior tibial veins are positive compress.    Peroneal Veins: The peroneal veins are positive compress.    Gastrocnemius Veins: The gastrocnemius veins are positive compress.    Additional Findings: N/A    IMPRESSION: Normal deep venous system at the RIGHT lower extremity with no evidence of venous thrombosis or obstruction in the veins evaluated.

## 2024-06-06 ENCOUNTER — OFFICE VISIT (OUTPATIENT)
Dept: PRIMARY CARE | Facility: CLINIC | Age: 39
End: 2024-06-06
Payer: COMMERCIAL

## 2024-06-06 VITALS — HEART RATE: 74 BPM | SYSTOLIC BLOOD PRESSURE: 159 MMHG | DIASTOLIC BLOOD PRESSURE: 95 MMHG

## 2024-06-06 DIAGNOSIS — R60.0 EDEMA OF RIGHT LOWER EXTREMITY: ICD-10-CM

## 2024-06-06 DIAGNOSIS — I87.321 IDIOPATHIC CHRONIC VENOUS HYPERTENSION OF RIGHT LEG WITH INFLAMMATION: Primary | ICD-10-CM

## 2024-06-06 PROCEDURE — 93971 EXTREMITY STUDY: CPT | Performed by: INTERNAL MEDICINE

## 2024-06-06 PROCEDURE — 36470 NJX SCLRSNT 1 INCMPTNT VEIN: CPT | Performed by: INTERNAL MEDICINE

## 2024-06-06 PROCEDURE — 76942 ECHO GUIDE FOR BIOPSY: CPT | Performed by: INTERNAL MEDICINE

## 2024-06-06 PROCEDURE — 3008F BODY MASS INDEX DOCD: CPT | Performed by: INTERNAL MEDICINE

## 2024-06-06 NOTE — PROGRESS NOTES
Venous Duplex      Indications:  Limb pain  Leg Edema    Sonographer: Aicha Gibbons RVT    TECHNIQUE:  Venous Duplex ultrasound spectral Doppler wave modality was performed with the patient in the supine and upright positions to determine the status of the deep and superficial systems of the right lower extremity. The common femoral, femoral and popliteal veins of the deep venous system were imaged. The superficial system was imaged entirely to include, but was not limited to, the saphenous-femoral junction (SFJ) down the greater saphenous vein from the groin to the ankle, and the saphenous-popliteal junction (SPJ), if present, at the popliteal fossa down the small saphenous vein (SSV) to the ankle. As indicated, the cranial extensions of the SSV (CESSV), the anterior accessory GSV (AAGSV), and the posterior accessory GSV (PAGSV) were also evaluated, if present. All areas meeting the criteria for venous reflux (500 milliseconds or greater in the saphenous veins and principle branches, 350 milliseconds in perforators and 1000 milliseconds in the deep system) were confirmed with spectral Doppler analysis and confirmatory images were documented:     FINDINGS ARE THE FOLLOWING:    RIGHT LEG:  There is no evidence of thrombus in the interrogated segments of the deep or superficial venous system. There is no evidence of deep venous reflux.     GSV dimensions: 5.0mm junction   proximal segment is non compressible   mid segment is non compressible   distal segment is non compressible  The Greater Saphenous Vein is non compressible, consistent with previous treatment    SSV dimensions: 2.6mm junction  2.6mm mid  There is <0.5 seconds of reflux in the Small Saphenous Vein    There are multiple incompetent tributaries along the thigh and lower leg.    Trib1 dimensions: 3.2mm  Trib2 dimensions: 2.8mm  There is >0.5 seconds of reflux in the tributaries    RIGHT LEG CONCLUSION:     The right leg venous duplex interrogation shows  that there is no evidence of deep vein thrombosis noted and no evidence superficial vein thrombosis noted.    There is no evidence of deep venous reflux in right leg    The patient's right greater saphenous vein appears closed from previous procedure.     The patient's right small saphenous vein has no reflux.    There are multiple dilated torturous refluxing tributaries noted in the right middle ,and lower leg, measured ranging from 3.2   to 2.8  mm in diameter.    Ultrasound Guided Sclerotherapy, Right    Procedure: Sclerotherapy, Multiple Veins, of the Right Lower Extremity Utilizing Ultrasound Needle Guidance.    Performed by Adele Hernandez MD, [facility], [facility]    Sonographer: Performed by: Aicha Gibbons RVT    Pre Procedure: The need for sclerotherapy was based on the previous abnormal vein findings from a physical examination and duplex ultrasound evaluation with color flow, spectral Doppler of the saphenous venous system. These refluxing or abnormal veins were treated using ultrasound-guided sclerotherapy injections to chemically ablate, or close, the affected veins. The goal of treatment is to improve the venous hemodynamics of this extremity and improve the patient's vein symptoms of vein disease. A 13-5 MHz linear array ultrasound transducer was used to identify the mapped veins. The needle was then strategically placed into the abnormal veins under ultrasound guidance. Administration of the sclerosing agent and the vessels' responses were observed by ultrasound. Duplex ultrasonography was used to diagnose the disease, confirm treatment success, and locate persistent/recurrent refluxing veins. Any additional abnormalities, e.g. deep venous involvement, were noted.    Provider's Assessment The patient returns today for follow-up of the right lower extremity. The patient is doing well. There are still some residual symptoms, as previously noted, including pain. Overall, the patient's symptoms have improved. There is  no swelling noted. No infection. Duplex Ultrasound shows remaining refluxing tributaries, which will be treated today.     Time out: Immediately prior to the procedure, the entire procedure team, along with the patient, verified the patient's identification, intended procedure, correct procedure site, and that all equipment and supplies were present. The patient signed the informed consent after reviewing the risks, benefits, and alternatives previously discussed with the patient by the physician. Time 9:40 AM    Anesthesia: None.    Procedure Description: The patient was positioned horizontally on the procedure table, and the skin of lower extremity treatment area was prepped with 70% isopropyl alcohol. Under ultrasound guidance, the abnormal veins were accessed with 25 gauge percutaneous needles. Administration of the sclerosing agent and the vessels' responses were observed with ultrasound. The puncture/injection(s) was performed under ultrasound guidance. Right Lower Extremity Sclerotherapy Treated Areas: thigh, calf from medial and anterior. Patient received a total of 1.25 ml of foam prepared with 1.5% STS 1:4 with room air (Tessari method), administered intravenously in 1 injections to sclerose selected veins.    Complications: None.    Post Treatment Assessment: The patient tolerated the procedure well. Discharge instructions were given to and discussed with the patient. The patient was instructed to phone for any unusual signs or symptoms. The patient understands and agrees. , In-office observation with elevation of the legs was utilized after treatment. , Graduated compression stockings were applied in the office. The patient is to wear them for: Overnight for a day and Daily for 14 day(s). The patient tolerated the procedure well. Discharge instructions were given to and discussed with the patient. The patient was instructed to phone for any unusual signs or symptoms. The patient understands and agrees. ,  Follow-up evaluation of the treatment is planned in 90 days .    Discussions  Patient has successfully finished his stage I procedures. Patient would need follow-up evaluation in 3 months.  I have encouraged him to continue to have life style changes including weight reduction, using calf muscle exercises to have better long term control of her vein disease.  Patient was advised to wear thigh-high graduated compression stockings at least for next 2 weeks regularly. Later patient needs to wear them during the time when sitting or standing for a long time including long travels. Further it is advisable for the patient to use at least over-the-counter 20-30 mmHg knee-high graduated compression stockings regularly.

## 2024-06-06 NOTE — Clinical Note
Thank you for sharing the care of this patient. Patient has successfully finished his stage I procedures. Patient would need follow-up evaluation in 3 months. I have encouraged him to continue to have life style changes including weight reduction, using calf muscle exercises to have better long term control of her vein disease. Patient was advised to wear thigh-high graduated compression stockings at least for next 2 weeks regularly. Later patient needs to wear them during the time when sitting or standing for a long time including long travels. Further it is advisable for the patient to use at least over-the-counter 20-30 mmHg knee-high graduated compression stockings regularly.

## 2024-09-04 ENCOUNTER — APPOINTMENT (OUTPATIENT)
Dept: PRIMARY CARE | Facility: CLINIC | Age: 39
End: 2024-09-04
Payer: COMMERCIAL

## 2024-09-11 ENCOUNTER — APPOINTMENT (OUTPATIENT)
Dept: PRIMARY CARE | Facility: CLINIC | Age: 39
End: 2024-09-11
Payer: COMMERCIAL

## 2024-09-11 VITALS
SYSTOLIC BLOOD PRESSURE: 127 MMHG | BODY MASS INDEX: 36.85 KG/M2 | HEIGHT: 71 IN | HEART RATE: 59 BPM | WEIGHT: 263.2 LBS | TEMPERATURE: 97.9 F | DIASTOLIC BLOOD PRESSURE: 86 MMHG

## 2024-09-11 DIAGNOSIS — R10.84 GENERALIZED ABDOMINAL PAIN: Primary | ICD-10-CM

## 2024-09-11 PROCEDURE — 1036F TOBACCO NON-USER: CPT | Performed by: INTERNAL MEDICINE

## 2024-09-11 PROCEDURE — 99213 OFFICE O/P EST LOW 20 MIN: CPT | Performed by: INTERNAL MEDICINE

## 2024-09-11 PROCEDURE — 3008F BODY MASS INDEX DOCD: CPT | Performed by: INTERNAL MEDICINE

## 2024-09-11 RX ORDER — OMEPRAZOLE 40 MG/1
40 CAPSULE, DELAYED RELEASE ORAL
Qty: 30 CAPSULE | Refills: 1 | Status: SHIPPED | OUTPATIENT
Start: 2024-09-11

## 2024-09-11 ASSESSMENT — PATIENT HEALTH QUESTIONNAIRE - PHQ9
SUM OF ALL RESPONSES TO PHQ9 QUESTIONS 1 AND 2: 0
1. LITTLE INTEREST OR PLEASURE IN DOING THINGS: NOT AT ALL
2. FEELING DOWN, DEPRESSED OR HOPELESS: NOT AT ALL

## 2024-09-11 NOTE — PROGRESS NOTES
Assessment/Plan   Problem List Items Addressed This Visit    None  Visit Diagnoses       Generalized abdominal pain    -  Primary    Relevant Medications    omeprazole (PriLOSEC) 40 mg DR capsule        It seems it was peptic ulcer disease and therefore 2-month treatment of omeprazole is being prescribed  Advised to avoid NSAID  If symptom recurs advised to come back we may do some more investigation then    Subjective   Patient ID: Aviva Martinez is a 39 y.o. male who presents for Follow-up (Stomach pains for 3 weeks. ).    Past Surgical History:   Procedure Laterality Date    LASER ABLATION OF VASCULAR LESION Right 05/20/2024    OTHER SURGICAL HISTORY  06/13/2022    No history of surgery      Family History   Problem Relation Name Age of Onset    Glaucoma Mother      Hypertension Mother      No Known Problems Father        Social History     Socioeconomic History    Marital status:      Spouse name: Not on file    Number of children: Not on file    Years of education: Not on file    Highest education level: Not on file   Occupational History    Not on file   Tobacco Use    Smoking status: Never     Passive exposure: Never    Smokeless tobacco: Never   Vaping Use    Vaping status: Never Used   Substance and Sexual Activity    Alcohol use: Yes     Alcohol/week: 1.0 standard drink of alcohol     Types: 1 Cans of beer per week     Comment: rarely    Drug use: Never    Sexual activity: Not on file   Other Topics Concern    Not on file   Social History Narrative    Not on file     Social Determinants of Health     Financial Resource Strain: Not on file   Food Insecurity: Not on file   Transportation Needs: Not on file   Physical Activity: Not on file   Stress: Not on file   Social Connections: Not on file   Intimate Partner Violence: Not on file   Housing Stability: Not on file      Patient has no known allergies.   Current Outpatient Medications   Medication Sig Dispense Refill    fluticasone (Flonase) 50  "mcg/actuation nasal spray Administer 2 sprays into affected nostril(s) once daily.      omeprazole (PriLOSEC) 40 mg DR capsule Take 1 capsule (40 mg) by mouth once daily in the morning. Take before meals. Do not crush or chew. 30 capsule 1     No current facility-administered medications for this visit.      Vitals:    09/11/24 1556   BP: 127/86   BP Location: Left arm   Patient Position: Sitting   Pulse: 59   Temp: 36.6 °C (97.9 °F)   Weight: 119 kg (263 lb 3.2 oz)   Height: 1.803 m (5' 11\")      Problem List Items Addressed This Visit    None  Visit Diagnoses       Generalized abdominal pain    -  Primary    Relevant Medications    omeprazole (PriLOSEC) 40 mg DR capsule           No orders of the defined types were placed in this encounter.       HPI  This is a 39-year-old male who presented today for follow-up  He had episode of abdominal pain mainly epigastric area 2 weeks ago lasting for quite a number of days and he started taking omeprazole which resulted in improvement  The pain was significant he had no nausea vomiting or diarrhea no fever no other symptoms but now it has resolved    ROS otherwise has been negative  Past medical history reviewed  Social and family history reviewed  Allergies and medications reviewed  Recent labs reviewed  Vital signs reviewed    PHYSICAL EXAM  Vascular chest abdominal neurological examination normal      No results found for: \"PR1\", \"BMPR1A\", \"CMPLAS\", \"EG1GVWEV\", \"KPSAT\"   Lab Results   Component Value Date    CHOL 178 09/07/2023    CHHDL 3.3 09/07/2023                "

## 2024-10-07 ENCOUNTER — APPOINTMENT (OUTPATIENT)
Dept: PRIMARY CARE | Facility: CLINIC | Age: 39
End: 2024-10-07
Payer: COMMERCIAL

## 2024-10-07 VITALS
HEART RATE: 60 BPM | WEIGHT: 264 LBS | DIASTOLIC BLOOD PRESSURE: 89 MMHG | HEIGHT: 71 IN | SYSTOLIC BLOOD PRESSURE: 128 MMHG | BODY MASS INDEX: 36.96 KG/M2

## 2024-10-07 DIAGNOSIS — I89.0 LYMPH EDEMA: ICD-10-CM

## 2024-10-07 DIAGNOSIS — M79.89 SWELLING OF RIGHT FOOT: ICD-10-CM

## 2024-10-07 DIAGNOSIS — R60.0 EDEMA OF RIGHT LOWER EXTREMITY: ICD-10-CM

## 2024-10-07 DIAGNOSIS — I87.321 IDIOPATHIC CHRONIC VENOUS HYPERTENSION OF RIGHT LEG WITH INFLAMMATION: Primary | ICD-10-CM

## 2024-10-07 PROCEDURE — 3008F BODY MASS INDEX DOCD: CPT | Performed by: INTERNAL MEDICINE

## 2024-10-07 PROCEDURE — 99214 OFFICE O/P EST MOD 30 MIN: CPT | Performed by: INTERNAL MEDICINE

## 2024-10-07 RX ORDER — OLIVE LEAF EXTRACT 250 MG
300 CAPSULE ORAL 2 TIMES DAILY
Qty: 60 CAPSULE | Refills: 3 | Status: SHIPPED | OUTPATIENT
Start: 2024-10-07 | End: 2024-11-06

## 2024-10-07 NOTE — PROGRESS NOTES
Chief Complaints:  Seen for follow-up after 3 months of procedures.    HPI:  39 years old male who has had right leg swellings much more than the left leg has had investigations for secondary causes of lymphedema and was negative.  Patient found to have significant venous insufficiency which was treated with the endovenous ablation and image guided foam sclerotherapy.  He is compliant with his lifestyle changes and also wearing the graduated compression stockings.  He is also doing the lymphedema pump at least once a day.  In spite of all these he is worried that his right leg is bigger than the left leg.  He has the feeling of heaviness and achiness.  He is symptoms are noted below.      Symptoms: Right leg swelling and muscle cramps. The patient is currently experiencing symptoms. Symptoms are located mainly in the right leg.  The patient describes the pain as aching, heavy, throbbing and stinging. Onset was gradual More than 2-3 year(s) ago. The symptoms occur intermittently. The episodes occur daily.  He describes this as moderate in severity but stable.  Exacerbating factors:  leg dependency, prolonged sitting and prolonged standing. Relieving factors:  elevation, exercises and support stockings. Associated symptoms: Leg swelling and cramps.  Current treatment includes leg elevation, graduated compression stocking usage and also lymphedema therapy with the pump. By report, there is fair compliance with treatment, fair tolerance of treatment and acceptable symptom control. Initial presentation included bulging veins, dilated veins and leg pain. Since diagnosis the disease has improved after procedure.  Recently, the disease has been stable. Disease complications:  chronic edema. Pertinent medical history:  no deep venous thrombosis, no hypercoagulable state, no pulmonary embolism and no thrombophlebitis. Risk factors:  family history of varicose veins.  Past evaluation has included duplex ultrasound. Past  "treatment has included compression stockings, Life style modification, leg elevation, image guided foam sclerotherapy and endovenous laser treatment.     ROS:  Respiratory:  No shortness of breath.  No cough, sinus congestion    Cardiovascular:    No chest pain.  No symptoms suggestive of claudication.  No cyanosis.  Palpitations, denies.  Had some abdominal discomfort and he is on PPI and will be following up with the PCP.    Neurologic:    Rare tingling/Numbness in the legs but does not bother him much.  No loss of strength.    Social History:  Tobacco Use:  None    Current Outpatient Medications on File Prior to Visit   Medication Sig Dispense Refill    fluticasone (Flonase) 50 mcg/actuation nasal spray Administer 2 sprays into affected nostril(s) once daily.      omeprazole (PriLOSEC) 40 mg DR capsule Take 1 capsule (40 mg) by mouth once daily in the morning. Take before meals. Do not crush or chew. 30 capsule 1     No current facility-administered medications on file prior to visit.        No Known Allergies     Examination:    Visit Vitals  /89   Pulse 60   Ht 1.803 m (5' 11\")   Wt 120 kg (264 lb)   BMI 36.82 kg/m²   Smoking Status Never   BSA 2.45 m²        Normal-built, well-nourished with no apparent distress. Alert oriented  Skin:  Normal turgor.  No rash.  Head:  Normocephalic, atraumatic.  Eyes:  Pupils are equal, round,.  No pallor of conjunctivae.  Mucous membranes are moist  Neck:  Supple.  No JVD.  No carotid bruit.  No thyromegaly. No cervical lymphadenopathy.  No clubbing  Chest:  Vesicular breathing. Bilaterally good air entry.  No wheezing.  No crackles.  Heart:  Regular rate and rhythm.  S1, S2 positive.  No murmur.  Abdomen:  Soft and nontender.  Bowel sounds are positive.  No organomegaly.  Extremities: Detailed leg exam below.  Unilateral right leg lymphedema noted.  Bilaterally 2+ dorsalis pedis pulses.  No calf tenderness. Homans sign is negative.  Neuro Exam: No focal signs. Gait is " normal.      Venous Exam:  Varicose veins in left calf absent  Varicose veins in left thigh absent  Varicose veins in right calf absent  Varicose veins in right thigh absent  Reticular veins in left calf absent  Reticular veins in left thigh absent  Reticular veins in right calf absent  Reticular veins in right thigh absent  Telangiectasias in left calf absent  Telangiectasias in left thigh absent  Telangiectasias in right calf absent  Telangiectasias in right thigh absent  Right leg 1+ edema absent  Left leg edema absent  Hyperpigmentation in left leg absent  Hyperpigmentation in right leg absent  Lipodermatosclerosis in right leg absent  Lipodermatosclerosis in left leg absent  Dermatitis in left leg absent  Dermatitis in right leg absent  [place] phlebectatica in left leg absent).  Corona phlebectatica in right leg absent  Atrophe carmen in left leg absent  Atrophe carmen in right leg absent  Ulcer(s) in left leg absent  Ulcer(s) in right leg absent    CEAP Classification  Right leg CEAP C 3S Ep  Left leg CEAP C 3S Ep      Diagnosis/ Problems    Assessment/Plan :  Problem List Items Addressed This Visit       Edema of right lower extremity    Relevant Medications    horse chestnut 300 mg capsule    Other Relevant Orders    Vascular US Lower Extremity Vein Mapping Bilateral    Swelling of right foot    Relevant Medications    horse chestnut 300 mg capsule    Other Relevant Orders    Vascular US Lower Extremity Vein Mapping Bilateral    Idiopathic chronic venous hypertension of right leg with inflammation - Primary    Relevant Medications    horse chestnut 300 mg capsule    Other Relevant Orders    Vascular US Lower Extremity Vein Mapping Bilateral    Lymph edema    Relevant Medications    horse chestnut 300 mg capsule    Other Relevant Orders    Vascular US Lower Extremity Vein Mapping Bilateral       No orders of the defined types were placed in this encounter.      Plan     Chronic venous insufficiency of right  lower extremities with other complications including lymphedema  Because of patient's recent exacerbation of the symptoms and continued evidence of chronic venous insufficiency on exam which is interfering with her day-to-day activities we will do a detailed ultrasound evaluation to decide any further interventions which are appropriate.    Discussions  Patient has significant improvement from the right leg venous congestions patient also following the appropriate instructions including wearing the graduated compression stockings and lifestyle modifications.    LYMPHEDEMA as per the clinical exam was discussed again in great detail with the patient.   Advised patient to continue to wear compression stockings mainly when she is standing or sitting. Other options which could help discussed are weight loss, regular aerobic exercise, keeping the legs elevated, local hygiene and some OTC as Horse Chest nut seed extract.  Lymph edema Physical therapy and a specialized treatment by lymphedema therapist was previously done and patient will continue at home.  He also will start the horse [place] seed extract and side effects were reviewed and discussed.    We discussed the recent documentation that the if the BMI is more than 40 the success of venous procedures are limited. I encouraged the patient that he should the try to improve his health by loosing weight if possible prior to the venous procedures. Given the patient's significant obesity with a BMI of 36.82 I advised the patient that she should work with the primary care physician and other appropriate consults to lose weight.    Patient will have the detailed venous ultrasound evaluation and he will be followed up a week after scan to review and discuss the management options.    Patient understands to continue to wear the graduated compression stockings and lifestyle modifications as we have discussed in the past and today.

## 2024-10-08 ENCOUNTER — TELEPHONE (OUTPATIENT)
Dept: CARDIOLOGY | Facility: CLINIC | Age: 39
End: 2024-10-08

## 2024-10-08 NOTE — TELEPHONE ENCOUNTER
Pharmacy left a vmail stating that they do not carry Horse [place] 300mg and they are unable to order it. Patient will have to get it at another pharmacy or over the counter.

## 2024-12-02 ENCOUNTER — APPOINTMENT (OUTPATIENT)
Dept: VASCULAR SURGERY | Facility: CLINIC | Age: 39
End: 2024-12-02
Payer: COMMERCIAL

## 2024-12-02 ENCOUNTER — OFFICE VISIT (OUTPATIENT)
Dept: PRIMARY CARE | Facility: CLINIC | Age: 39
End: 2024-12-02
Payer: COMMERCIAL

## 2024-12-02 DIAGNOSIS — I87.321 IDIOPATHIC CHRONIC VENOUS HYPERTENSION OF RIGHT LEG WITH INFLAMMATION: ICD-10-CM

## 2024-12-02 DIAGNOSIS — M79.89 SWELLING OF RIGHT FOOT: ICD-10-CM

## 2024-12-02 DIAGNOSIS — I89.0 LYMPH EDEMA: ICD-10-CM

## 2024-12-02 DIAGNOSIS — R60.0 EDEMA OF RIGHT LOWER EXTREMITY: ICD-10-CM

## 2024-12-02 PROCEDURE — 93970 EXTREMITY STUDY: CPT | Performed by: INTERNAL MEDICINE

## 2024-12-02 NOTE — PROGRESS NOTES
Venous Duplex      Indications:  Limb pain  Leg Edema    Sonographer: Aicha Gibbons RVT    TECHNIQUE:  Venous Duplex ultrasound spectral Doppler wave modality was performed with the patient in the supine and upright positions to determine the status of the deep and superficial systems of the right and left lower extremity. The common femoral, femoral and popliteal veins of the deep venous system were imaged. The superficial system was imaged entirely to include, but was not limited to, the saphenous-femoral junction (SFJ) down the greater saphenous vein from the groin to the ankle, and the saphenous-popliteal junction (SPJ), if present, at the popliteal fossa down the small saphenous vein (SSV) to the ankle. As indicated, the cranial extensions of the SSV (CESSV), the anterior accessory GSV (AAGSV), and the posterior accessory GSV (PAGSV) were also evaluated, if present. All areas meeting the criteria for venous reflux (500 milliseconds or greater in the saphenous veins and principle branches, 350 milliseconds in perforators and 1000 milliseconds in the deep system) were confirmed with spectral Doppler analysis and confirmatory images were documented:     FINDINGS ARE THE FOLLOWING:    RIGHT LEG:  There is no evidence of thrombus in the interrogated segments of the deep or superficial venous system. There is no evidence of deep venous reflux.     GSV dimensions: 5.5mm junction   Proximal segment is non compressible   Mid segment is non compressible   Distal segment is non compressible  The Greater Saphenous Vein is non compressible, consistent with previous treatment    SSV dimensions: 2.7mm junction  2.0mm mid  There is <0.5 seconds of reflux in the Small Saphenous Vein    There are multiple incompetent tributaries along the thigh and lower leg.    Trib1 dimensions: 1.3mm  Trib2 dimensions: 2.0mm  There is >0.5 seconds of reflux in the tributaries         CONCLUSIONS:  There is no evidence of thrombus in the  interrogated segments of the deep or superficial venous system in the RT LE.  No evidence of deep venous reflux.  Few incompetent tributaries are seen in the RT LE as noted in the MAP which measured only up to 2 mm in diameter.    Discussion  Patient's previously identified refluxing truncal vein is successfully treated.  The tributaries which are refluxing a few and less than 3 mm in diameter and probably patient could continue the medical management but clinical correlation advised.

## 2024-12-05 ENCOUNTER — APPOINTMENT (OUTPATIENT)
Dept: PRIMARY CARE | Facility: CLINIC | Age: 39
End: 2024-12-05
Payer: COMMERCIAL

## 2024-12-16 ENCOUNTER — APPOINTMENT (OUTPATIENT)
Dept: PRIMARY CARE | Facility: CLINIC | Age: 39
End: 2024-12-16
Payer: COMMERCIAL

## 2024-12-16 VITALS
WEIGHT: 263 LBS | HEIGHT: 71 IN | HEART RATE: 65 BPM | DIASTOLIC BLOOD PRESSURE: 77 MMHG | SYSTOLIC BLOOD PRESSURE: 132 MMHG | BODY MASS INDEX: 36.82 KG/M2

## 2024-12-16 DIAGNOSIS — I89.0 LYMPH EDEMA: ICD-10-CM

## 2024-12-16 DIAGNOSIS — I87.321 IDIOPATHIC CHRONIC VENOUS HYPERTENSION OF RIGHT LEG WITH INFLAMMATION: Primary | ICD-10-CM

## 2024-12-16 DIAGNOSIS — R60.0 EDEMA OF RIGHT LOWER EXTREMITY: ICD-10-CM

## 2024-12-16 PROCEDURE — 1036F TOBACCO NON-USER: CPT | Performed by: INTERNAL MEDICINE

## 2024-12-16 PROCEDURE — 99214 OFFICE O/P EST MOD 30 MIN: CPT | Performed by: INTERNAL MEDICINE

## 2024-12-16 PROCEDURE — 3008F BODY MASS INDEX DOCD: CPT | Performed by: INTERNAL MEDICINE

## 2024-12-16 NOTE — PROGRESS NOTES
"  Chief Complaints:  Seen for follow-up after detailed ultrasound evaluation.    HPI:  39 years old male who has had right leg swelling and found to have lymphedema and also chronic venous insufficiency came for a follow-up visit after he has had a detailed venous ultrasound evaluation done.  Patient says overall he is stable but he has hard time losing weight.  He also has some concern that his right leg swelling is not completely gone.  He is compliant with wearing the graduated compression stocking and also using the lymphedema pump regularly.  He has feeling of heaviness and also sometimes discomfort in the leg.    He is active but admits that he has hard time losing weight.    He denies any history of DVT or PE.    ROS:  Respiratory:  No shortness of breath.  No cough, sinus congestion    Cardiovascular:    No chest pain.  No symptoms of claudication.  No cyanosis.  Palpitations, denies.    Neurologic:    No tingling/Numbness.  No loss of strength.    Social History:  Tobacco Use:  None    Current Outpatient Medications on File Prior to Visit   Medication Sig Dispense Refill    fluticasone (Flonase) 50 mcg/actuation nasal spray Administer 2 sprays into affected nostril(s) once daily.      omeprazole (PriLOSEC) 40 mg DR capsule Take 1 capsule (40 mg) by mouth once daily in the morning. Take before meals. Do not crush or chew. 30 capsule 1     No current facility-administered medications on file prior to visit.        No Known Allergies     Examination:    Visit Vitals  /77 (BP Location: Left arm, Patient Position: Sitting, BP Cuff Size: Adult)   Pulse 65   Ht 1.803 m (5' 11\")   Wt 119 kg (263 lb)   BMI 36.68 kg/m²   Smoking Status Never   BSA 2.44 m²        Normal-built, well-nourished with no apparent distress. Alert oriented  Skin:  Normal turgor.  No rash.  Head:  Normocephalic, atraumatic.  Eyes:  Pupils are equal, round,.  No pallor of conjunctivae.  Mucous membranes are moist  Neck:  Supple.  No JVD. "  No carotid bruit.  No thyromegaly. No cervical lymphadenopathy.  No clubbing  Chest:  Vesicular breathing. Bilaterally good air entry.  No wheezing.  No crackles.  Heart:  Regular rate and rhythm.  S1, S2 positive.  No murmur.  Abdomen:  Soft and nontender.  Bowel sounds are positive.  No organomegaly.  Extremities: Detailed leg exam below.  Unilateral right leg lymphedema noted.  Bilaterally 2+ dorsalis pedis pulses.  No calf tenderness. Homans sign is negative.  Neuro Exam: No focal signs. Gait is normal.      Venous Exam:  Varicose veins in left calf absent  Varicose veins in left thigh absent  Varicose veins in right calf absent  Varicose veins in right thigh absent  Reticular veins in left calf absent  Reticular veins in left thigh absent  Reticular veins in right calf absent  Reticular veins in right thigh absent  Telangiectasias in left calf absent  Telangiectasias in left thigh absent  Telangiectasias in right calf absent  Telangiectasias in right thigh absent  Right leg 1+ edema absent  Left leg edema absent  Hyperpigmentation in left leg absent  Hyperpigmentation in right leg absent  Lipodermatosclerosis in right leg absent  Lipodermatosclerosis in left leg absent  Dermatitis in left leg absent  Dermatitis in right leg absent  Corona phlebectatica in left leg absent).  Corona phlebectatica in right leg absent  Atrophe carmen in left leg absent  Atrophe carmen in right leg absent  Ulcer(s) in left leg absent  Ulcer(s) in right leg absent    CEAP Classification  Right leg CEAP C 3S Ep  Left leg CEAP C 1A Ep        Assessment/Plan :  Problem List Items Addressed This Visit       Edema of right lower extremity    Relevant Orders    Vascular US Lower Extremity Venous Insufficiency Right    BMI 36.0-36.9,adult    Idiopathic chronic venous hypertension of right leg with inflammation - Primary    Relevant Orders    Vascular US Lower Extremity Venous Insufficiency Right    Lymph edema    Relevant Orders     Vascular US Lower Extremity Venous Insufficiency Right         Plan and discussion    39 years old male who has right leg swellings which has had lymphedema and also chronic venous insufficiency is successfully treated for his chronic venous insufficiency.  He has 2 refluxing tributaries which are measured maximum up to 2 mm in diameter and they are very short segments.  Patient is compliant with lymphedema therapy and also with wearing the graduated compression stockings.  His symptoms are much better but still he is somewhat concerned and disappointed that his leg swellings have not completely resolved.  I had a lengthy discussion with the patient that his lymphedema is chronic and may not completely resolve.  At this time he will continue the current conservative management and he will have a repeat detailed venous mapping for his right leg and he will be followed up to decide about any treatment options if its appropriate for the refluxing tributaries.      We discussed the recent documentation that the if the BMI is more than 40 the success of venous procedures are limited. I encouraged the patient that he should the try to improve his health by loosing weight if possible prior to the venous procedures. Given the patient's significant obesity with a BMI of 36.68 I advised the patient that she should work with the primary care physician and other appropriate consults to lose weight.    Patient will be followed up in 6 months after his detailed venous ultrasound evaluation prior to the visit.

## 2024-12-23 ENCOUNTER — APPOINTMENT (OUTPATIENT)
Dept: PRIMARY CARE | Facility: CLINIC | Age: 39
End: 2024-12-23
Payer: COMMERCIAL

## 2024-12-23 VITALS
HEIGHT: 71 IN | SYSTOLIC BLOOD PRESSURE: 127 MMHG | BODY MASS INDEX: 37.24 KG/M2 | DIASTOLIC BLOOD PRESSURE: 81 MMHG | WEIGHT: 266 LBS | HEART RATE: 63 BPM

## 2024-12-23 DIAGNOSIS — R60.0 EDEMA OF RIGHT LOWER EXTREMITY: ICD-10-CM

## 2024-12-23 DIAGNOSIS — E88.810 METABOLIC SYNDROME X: ICD-10-CM

## 2024-12-23 DIAGNOSIS — Z13.29 SCREENING FOR THYROID DISORDER: ICD-10-CM

## 2024-12-23 DIAGNOSIS — E66.9 OBESITY (BMI 30-39.9): Primary | ICD-10-CM

## 2024-12-23 DIAGNOSIS — R73.03 PREDIABETES: ICD-10-CM

## 2024-12-23 DIAGNOSIS — E78.9 BORDERLINE HIGH CHOLESTEROL: ICD-10-CM

## 2024-12-23 DIAGNOSIS — I89.0 LYMPH EDEMA: ICD-10-CM

## 2024-12-23 PROBLEM — R03.0 ELEVATED BLOOD PRESSURE READING: Status: RESOLVED | Noted: 2023-12-08 | Resolved: 2024-12-23

## 2024-12-23 PROCEDURE — 1036F TOBACCO NON-USER: CPT | Performed by: INTERNAL MEDICINE

## 2024-12-23 PROCEDURE — 3008F BODY MASS INDEX DOCD: CPT | Performed by: INTERNAL MEDICINE

## 2024-12-23 PROCEDURE — 99214 OFFICE O/P EST MOD 30 MIN: CPT | Performed by: INTERNAL MEDICINE

## 2024-12-23 ASSESSMENT — LIFESTYLE VARIABLES
AUDIT-C TOTAL SCORE: 1
AUDIT TOTAL SCORE: 1
HOW MANY STANDARD DRINKS CONTAINING ALCOHOL DO YOU HAVE ON A TYPICAL DAY: 1 OR 2
HAVE YOU OR SOMEONE ELSE BEEN INJURED AS A RESULT OF YOUR DRINKING: NO
HOW OFTEN DO YOU HAVE A DRINK CONTAINING ALCOHOL: MONTHLY OR LESS
HAS A RELATIVE, FRIEND, DOCTOR, OR ANOTHER HEALTH PROFESSIONAL EXPRESSED CONCERN ABOUT YOUR DRINKING OR SUGGESTED YOU CUT DOWN: NO
HOW OFTEN DO YOU HAVE SIX OR MORE DRINKS ON ONE OCCASION: NEVER
SKIP TO QUESTIONS 9-10: 1

## 2024-12-23 NOTE — PROGRESS NOTES
Assessment/Plan   39 years old male who is generally healthy and active has metabolic syndrome which includes patient has overweight, has had some elevated blood pressure, elevated hemoglobin A1c with possible prediabetes and also had elevated LDL.  I discussed with the patient the risk of having metabolic syndrome which is predisposing to the atherosclerotic heart disease.  The importance of lifestyle modification was discussed.  We discussed about the treatment options including metformin and/or other newer medications such as semaglutide.  After detailed discussion he would prefer to go on semaglutide.  We discussed the side effects and the benefits.  We discussed the target goal.  Patient understands his target weight would be close to or below 225 pounds.  But I advised him that he should at least aim for a short-term to 50 pounds.  Patient verbalizes the understanding.  He will be started on the semaglutide and will be followed up in 1 month.  In the meantime he will have a basic blood work as ordered.  Patient has had mild leukopenia which he says has had viral illness to do that.  Since there was no repeat blood test to confirm his white cell count has improved he will have a CBC also done.          Problem List Items Addressed This Visit       Borderline high cholesterol    Relevant Orders    CBC and Auto Differential    Comprehensive Metabolic Panel    Lipid Panel    Edema of right lower extremity    Obesity (BMI 30-39.9) - Primary    Relevant Medications    semaglutide 0.25 mg or 0.5 mg (2 mg/3 mL) pen injector    Lymph edema    Metabolic syndrome X    Prediabetes    Relevant Medications    semaglutide 0.25 mg or 0.5 mg (2 mg/3 mL) pen injector    Other Relevant Orders    Hemoglobin A1C    TSH with reflex to Free T4 if abnormal     Other Visit Diagnoses       Screening for thyroid disorder        Relevant Orders    TSH with reflex to Free T4 if abnormal            Subjective     Patient ID: Aviva Martinez  is a 39 y.o. male who presents for Weight Check (Discussion to loose weight ).    History of present illness  39 years old male who has been active came for a visit because his physician is not available and wants to discuss about his general medical care and about his weight.    He has been active exercising 3 times a week cardiovascular exercise and 4 times a week weights and rosuvastatin exercise.  Patient also has been overlapping these exercises.  He is trying to watch diet but he acknowledges that there are times for convenience to eat unhealthy food and also high calorie diet.    Patient has been treated for lymphedema of the right leg and he is compliant with compression stockings and lymphedema therapy with the pump.    He has not had a blood test done almost a year now.  He does not check his blood sugars.    He denies any urine bowel symptoms and he has no abdominal pain no fever no chills.    He denies any significant snoring.  Rest of the patient's past history and family history are reviewed.    Family History   Problem Relation Name Age of Onset    Glaucoma Mother      Hypertension Mother      No Known Problems Father        Social History     Socioeconomic History    Marital status:      Spouse name: Not on file    Number of children: Not on file    Years of education: Not on file    Highest education level: Not on file   Occupational History    Not on file   Tobacco Use    Smoking status: Never     Passive exposure: Never    Smokeless tobacco: Never   Vaping Use    Vaping status: Never Used   Substance and Sexual Activity    Alcohol use: Yes     Alcohol/week: 1.0 standard drink of alcohol     Types: 1 Cans of beer per week     Comment: rarely    Drug use: Never    Sexual activity: Not on file   Other Topics Concern    Not on file   Social History Narrative    Not on file     Social Drivers of Health     Financial Resource Strain: Not on file   Food Insecurity: Not on file   Transportation  Needs: Not on file   Physical Activity: Not on file   Stress: Not on file   Social Connections: Not on file   Intimate Partner Violence: Not on file   Housing Stability: Not on file      Patient has no known allergies.   Current Outpatient Medications   Medication Sig Dispense Refill    fluticasone (Flonase) 50 mcg/actuation nasal spray Administer 2 sprays into affected nostril(s) once daily.      omeprazole (PriLOSEC) 40 mg DR capsule Take 1 capsule (40 mg) by mouth once daily in the morning. Take before meals. Do not crush or chew. 30 capsule 1    semaglutide 0.25 mg or 0.5 mg (2 mg/3 mL) pen injector Inject 0.25 mg under the skin 1 (one) time per week. 3 mL 1     No current facility-administered medications for this visit.       Objective     Vitals:    12/23/24 1203   BP: 127/81   Pulse: 63        Physical Exam    Normal-built, well-nourished with no apparent distress. Alert oriented  Skin:  Normal turgor.  No rash.  Head:  Normocephalic, atraumatic.  Eyes:  Pupils are equal, round,.  No pallor of conjunctivae.  Mucous membranes are moist  Neck:  Supple.  No JVD.  No carotid bruit.  No thyromegaly. No cervical lymphadenopathy.  No clubbing  Chest:  Vesicular breathing. Bilaterally good air entry.  No wheezing.  No crackles.  Heart:  Regular rate and rhythm.  S1, S2 positive.  No murmur.  Abdomen:  Soft and nontender.  Bowel sounds are positive.  No organomegaly.  Extremities:  Unilateral right leg lymphedema noted.  Bilaterally 2+ dorsalis pedis pulses.  No calf tenderness. Homans sign is negative.  Neuro Exam: No focal signs. Gait is normal.        Problem List Items Addressed This Visit       Borderline high cholesterol    Relevant Orders    CBC and Auto Differential    Comprehensive Metabolic Panel    Lipid Panel    Edema of right lower extremity    Obesity (BMI 30-39.9) - Primary    Relevant Medications    semaglutide 0.25 mg or 0.5 mg (2 mg/3 mL) pen injector    Lymph edema    Metabolic syndrome X     Prediabetes    Relevant Medications    semaglutide 0.25 mg or 0.5 mg (2 mg/3 mL) pen injector    Other Relevant Orders    Hemoglobin A1C    TSH with reflex to Free T4 if abnormal     Other Visit Diagnoses       Screening for thyroid disorder        Relevant Orders    TSH with reflex to Free T4 if abnormal             Orders Placed This Encounter   Procedures    CBC and Auto Differential     Standing Status:   Future     Standing Expiration Date:   3/23/2025     Order Specific Question:   Release result to [facility]     Answer:   Immediate    Comprehensive Metabolic Panel     Standing Status:   Future     Standing Expiration Date:   3/23/2025     Order Specific Question:   Release result to [facility]     Answer:   Immediate    Lipid Panel     fasting     Standing Status:   Future     Standing Expiration Date:   3/23/2025     Order Specific Question:   Release result to [facility]     Answer:   Immediate [1]    Hemoglobin A1C     Standing Status:   Future     Standing Expiration Date:   4/23/2025     Order Specific Question:   Release result to [facility]     Answer:   Immediate    TSH with reflex to Free T4 if abnormal     Standing Status:   Future     Standing Expiration Date:   3/23/2025     Order Specific Question:   Release result to [facility]     Answer:   Immediate        Lab Results   Component Value Date    WBC 2.8 (L) 09/07/2023    HGB 14.3 09/07/2023    HCT 43.0 09/07/2023     09/07/2023    CHOL 178 09/07/2023    TRIG 50 09/07/2023    HDL 53.6 09/07/2023    ALT 18 09/07/2023    AST 26 09/07/2023     09/07/2023    K 3.9 09/07/2023     09/07/2023    CREATININE 1.13 09/07/2023    BUN 11 09/07/2023    CO2 30 09/07/2023    TSH 2.00 06/15/2022    HGBA1C 5.7 (A) 06/15/2022     Lab Results   Component Value Date    CHOL 178 09/07/2023    CHHDL 3.3 09/07/2023       No results found.

## 2024-12-24 ENCOUNTER — LAB (OUTPATIENT)
Dept: LAB | Facility: LAB | Age: 39
End: 2024-12-24
Payer: COMMERCIAL

## 2024-12-24 DIAGNOSIS — E78.9 BORDERLINE HIGH CHOLESTEROL: ICD-10-CM

## 2024-12-24 DIAGNOSIS — Z13.29 SCREENING FOR THYROID DISORDER: ICD-10-CM

## 2024-12-24 DIAGNOSIS — R73.03 PREDIABETES: ICD-10-CM

## 2024-12-24 LAB
ALBUMIN SERPL BCP-MCNC: 4.3 G/DL (ref 3.4–5)
ALP SERPL-CCNC: 56 U/L (ref 33–120)
ALT SERPL W P-5'-P-CCNC: 21 U/L (ref 10–52)
ANION GAP SERPL CALC-SCNC: 13 MMOL/L (ref 10–20)
AST SERPL W P-5'-P-CCNC: 23 U/L (ref 9–39)
BASOPHILS # BLD AUTO: 0.02 X10*3/UL (ref 0–0.1)
BASOPHILS NFR BLD AUTO: 0.5 %
BILIRUB SERPL-MCNC: 0.7 MG/DL (ref 0–1.2)
BUN SERPL-MCNC: 11 MG/DL (ref 6–23)
CALCIUM SERPL-MCNC: 9.8 MG/DL (ref 8.6–10.6)
CHLORIDE SERPL-SCNC: 104 MMOL/L (ref 98–107)
CHOLEST SERPL-MCNC: 204 MG/DL (ref 0–199)
CHOLESTEROL/HDL RATIO: 4.1
CO2 SERPL-SCNC: 28 MMOL/L (ref 21–32)
CREAT SERPL-MCNC: 1.12 MG/DL (ref 0.5–1.3)
EGFRCR SERPLBLD CKD-EPI 2021: 85 ML/MIN/1.73M*2
EOSINOPHIL # BLD AUTO: 0.05 X10*3/UL (ref 0–0.7)
EOSINOPHIL NFR BLD AUTO: 1.2 %
ERYTHROCYTE [DISTWIDTH] IN BLOOD BY AUTOMATED COUNT: 14.3 % (ref 11.5–14.5)
EST. AVERAGE GLUCOSE BLD GHB EST-MCNC: 111 MG/DL
GLUCOSE SERPL-MCNC: 78 MG/DL (ref 74–99)
HBA1C MFR BLD: 5.5 %
HCT VFR BLD AUTO: 41.3 % (ref 41–52)
HDLC SERPL-MCNC: 50 MG/DL
HGB BLD-MCNC: 14.2 G/DL (ref 13.5–17.5)
IMM GRANULOCYTES # BLD AUTO: 0.01 X10*3/UL (ref 0–0.7)
IMM GRANULOCYTES NFR BLD AUTO: 0.2 % (ref 0–0.9)
LDLC SERPL CALC-MCNC: 135 MG/DL
LYMPHOCYTES # BLD AUTO: 1.88 X10*3/UL (ref 1.2–4.8)
LYMPHOCYTES NFR BLD AUTO: 46.7 %
MCH RBC QN AUTO: 28.5 PG (ref 26–34)
MCHC RBC AUTO-ENTMCNC: 34.4 G/DL (ref 32–36)
MCV RBC AUTO: 83 FL (ref 80–100)
MONOCYTES # BLD AUTO: 0.32 X10*3/UL (ref 0.1–1)
MONOCYTES NFR BLD AUTO: 7.9 %
NEUTROPHILS # BLD AUTO: 1.75 X10*3/UL (ref 1.2–7.7)
NEUTROPHILS NFR BLD AUTO: 43.5 %
NON HDL CHOLESTEROL: 154 MG/DL (ref 0–149)
NRBC BLD-RTO: 0 /100 WBCS (ref 0–0)
PLATELET # BLD AUTO: 237 X10*3/UL (ref 150–450)
POTASSIUM SERPL-SCNC: 4.2 MMOL/L (ref 3.5–5.3)
PROT SERPL-MCNC: 7 G/DL (ref 6.4–8.2)
RBC # BLD AUTO: 4.99 X10*6/UL (ref 4.5–5.9)
SODIUM SERPL-SCNC: 141 MMOL/L (ref 136–145)
TRIGL SERPL-MCNC: 93 MG/DL (ref 0–149)
TSH SERPL-ACNC: 2.05 MIU/L (ref 0.44–3.98)
VLDL: 19 MG/DL (ref 0–40)
WBC # BLD AUTO: 4 X10*3/UL (ref 4.4–11.3)

## 2024-12-24 PROCEDURE — 80061 LIPID PANEL: CPT

## 2024-12-24 PROCEDURE — 80053 COMPREHEN METABOLIC PANEL: CPT

## 2024-12-24 PROCEDURE — 83036 HEMOGLOBIN GLYCOSYLATED A1C: CPT

## 2024-12-24 PROCEDURE — 84443 ASSAY THYROID STIM HORMONE: CPT

## 2024-12-24 PROCEDURE — 85025 COMPLETE CBC W/AUTO DIFF WBC: CPT

## 2024-12-26 ENCOUNTER — TELEPHONE (OUTPATIENT)
Dept: PRIMARY CARE | Facility: CLINIC | Age: 39
End: 2024-12-26
Payer: COMMERCIAL

## 2024-12-26 DIAGNOSIS — E88.810 METABOLIC SYNDROME X: Primary | ICD-10-CM

## 2024-12-26 DIAGNOSIS — R73.03 PREDIABETES: ICD-10-CM

## 2024-12-26 DIAGNOSIS — E66.9 OBESITY (BMI 30-39.9): ICD-10-CM

## 2024-12-26 RX ORDER — METFORMIN HYDROCHLORIDE 500 MG/1
500 TABLET ORAL
Qty: 60 TABLET | Refills: 1 | Status: SHIPPED | OUTPATIENT
Start: 2024-12-26 | End: 2024-12-26 | Stop reason: SDUPTHER

## 2024-12-26 NOTE — TELEPHONE ENCOUNTER
Dr. Hernandez had me call the pt to let him know that the Semaglutide was denied by insurance. Dr. Hernandez wants to know if the pt wants to go on Metformin.

## 2024-12-26 NOTE — TELEPHONE ENCOUNTER
----- Message from Jarrod Hernandez sent at 12/24/2024  3:55 PM EST -----  Labs acceptable but there are a few abnormalities similar to previous labs.  White cell count is much improved.  Bad cholesterol is increased to 135.  He should do the lifestyle modification to control the cholesterol.  Please advise patient to continue the same medications and follow-up to review in detail and discuss the management options as already scheduled appointment.

## 2024-12-29 ENCOUNTER — OFFICE VISIT (OUTPATIENT)
Dept: URGENT CARE | Age: 39
End: 2024-12-29
Payer: COMMERCIAL

## 2024-12-29 VITALS
DIASTOLIC BLOOD PRESSURE: 78 MMHG | SYSTOLIC BLOOD PRESSURE: 135 MMHG | HEART RATE: 84 BPM | TEMPERATURE: 99.1 F | WEIGHT: 266 LBS | BODY MASS INDEX: 37.1 KG/M2 | OXYGEN SATURATION: 95 %

## 2024-12-29 DIAGNOSIS — J40 BRONCHITIS: Primary | ICD-10-CM

## 2024-12-29 DIAGNOSIS — R05.2 SUBACUTE COUGH: ICD-10-CM

## 2024-12-29 PROCEDURE — 1036F TOBACCO NON-USER: CPT | Performed by: STUDENT IN AN ORGANIZED HEALTH CARE EDUCATION/TRAINING PROGRAM

## 2024-12-29 PROCEDURE — 99213 OFFICE O/P EST LOW 20 MIN: CPT | Performed by: STUDENT IN AN ORGANIZED HEALTH CARE EDUCATION/TRAINING PROGRAM

## 2024-12-29 RX ORDER — ALBUTEROL SULFATE 90 UG/1
2 INHALANT RESPIRATORY (INHALATION) EVERY 6 HOURS PRN
Qty: 18 G | Refills: 0 | Status: SHIPPED | OUTPATIENT
Start: 2024-12-29 | End: 2025-01-23

## 2024-12-29 RX ORDER — PREDNISONE 20 MG/1
20 TABLET ORAL DAILY
Qty: 3 TABLET | Refills: 0 | Status: SHIPPED | OUTPATIENT
Start: 2024-12-29 | End: 2025-01-01

## 2024-12-29 RX ORDER — DOXYCYCLINE 100 MG/1
100 CAPSULE ORAL 2 TIMES DAILY
Qty: 14 CAPSULE | Refills: 0 | Status: SHIPPED | OUTPATIENT
Start: 2024-12-29 | End: 2025-01-05

## 2024-12-29 NOTE — PROGRESS NOTES
Subjective   Patient ID: Aviva Martinez is a 39 y.o. male. They present today with a chief complaint of Cough (3 weeks wet and yellow- pt denies new or worsening sx ).    History of Present Illness  Aviva is a pleasant 39-year-old male who presents to the urgent care for evaluation of cough productive of yellow sputum for about 3 weeks duration.  Patient denies chest pain, shortness of breath or new leg swelling or leg or calf pain.  Patient states he has chronic lymphedema on the right side and denies any cardiac history or chest pain or shortness of breath.  Patient is requesting evaluation reassurance given similar presentation to his son who is here today to be seen as well.  Patient denies recorded fever.  Patient denies any other symptoms or concerns otherwise.    Past Medical History  Allergies as of 12/29/2024    (No Known Allergies)       (Not in a hospital admission)       Past Medical History:   Diagnosis Date    Other conditions influencing health status     No significant past medical history       Past Surgical History:   Procedure Laterality Date    LASER ABLATION OF VASCULAR LESION Right 05/20/2024    OTHER SURGICAL HISTORY  06/13/2022    No history of surgery        reports that he has never smoked. He has never been exposed to tobacco smoke. He has never used smokeless tobacco. He reports current alcohol use of about 1.0 standard drink of alcohol per week. He reports that he does not use drugs.    Review of Systems  A 10-point review of systems was performed, otherwise unremarkable unless stated in the history of present illness.                Objective    Vitals:    12/29/24 0933   BP: 135/78   Pulse: 84   Temp: 37.3 °C (99.1 °F)   SpO2: 95%   Weight: 121 kg (266 lb)     No LMP for male patient.    Gen: Vitals noted and reviewed, no evidence of acute distress, well developed and afebrile.   Psych: Mood and affect appropriate for setting.  Head/Face: Atraumatic and normocephalic.   Neuro: No  focal deficits noted.  ENT: TMs clear bilaterally, EACs unremarkable. Mastoids non-tender. Posterior oropharynx without erythema, exudate, or swelling. Uvula is in the midline and non-edematous.   Neck: Supple. No meningismus through full range of motion. No lymphadenopathy.   Cardiac: Regular rate and rhythm no murmur.   Lungs: Clear to auscultation throughout, No evidence of wheezing, rhonchi or crackles. Good aeration throughout.   Extremities: Symmetrical, No peripheral edema, negative raza's sign bilaterally   Skin: Without evidence of ecchymosis, wounds, or rashes.      Point of Care Test & Imaging Results from this visit  No results found for this visit on 12/29/24.   No results found.    Diagnostic study results (if any) were reviewed by Kimberley Molina DO.    Assessment/Plan   Allergies, medications, history, and pertinent labs/EKGs/Imaging reviewed by Kimberley Molina DO.     Medical Decision Making  Discussed with the patient symptoms and clinical presentation findings suggestive of a subacute cough likely secondary to postviral cough syndrome versus developing bronchitis of unclear etiology.  We advise close symptom monitoring supportive treatment and use of over-the-counter remedies for added symptom relief.  Given the patient's duration of symptoms lack of improvement with supportive care we did agree to initiate antimicrobial coverage and prescribe doxycycline.  We also prescribed a short course of prednisone along with an albuterol inhaler to aid in symptom management.  We advised patient to closely follow-up with primary care provider and 3 to 4 days duration for reassessment, sooner if not improved.  We did offer radiographic imaging in office today however the patient opted to forego this and will wait to see primary care provider if not improved with above stated treatment plan. We also advised seeking immediate emergency medical attention if symptoms fail to improve, worsen or any concerning  symptoms arise. Patient voiced full understanding and agreement to plan.    Orders and Diagnoses  Diagnoses and all orders for this visit:  Bronchitis  -     doxycycline (Vibramycin) 100 mg capsule; Take 1 capsule (100 mg) by mouth 2 times a day for 7 days. Take with at least 8 ounces (large glass) of water, do not lie down for 30 minutes after  -     albuterol 90 mcg/actuation inhaler; Inhale 2 puffs every 6 hours if needed for wheezing (Cough) for up to 25 days.  -     predniSONE (Deltasone) 20 mg tablet; Take 1 tablet (20 mg) by mouth once daily for 3 days. Take with food. No NSAIDs with this  Subacute cough      Medical Admin Record      Patient disposition: Home    Electronically signed by Kimberley Molina DO  10:03 AM

## 2024-12-29 NOTE — PATIENT INSTRUCTIONS
We advised patient to closely follow-up with primary care provider and 3 to 4 days duration for reassessment, sooner if not improved.  We did offer radiographic imaging in office today however the patient opted to forego this and will wait to see primary care provider if not improved with above stated treatment plan. We also advised seeking immediate emergency medical attention if symptoms fail to improve, worsen or any concerning symptoms arise. Patient voiced full understanding and agreement to plan.

## 2025-02-10 ENCOUNTER — APPOINTMENT (OUTPATIENT)
Dept: PRIMARY CARE | Facility: CLINIC | Age: 40
End: 2025-02-10
Payer: COMMERCIAL

## 2025-02-10 VITALS
WEIGHT: 248 LBS | SYSTOLIC BLOOD PRESSURE: 116 MMHG | DIASTOLIC BLOOD PRESSURE: 81 MMHG | HEART RATE: 92 BPM | HEIGHT: 71 IN | BODY MASS INDEX: 34.72 KG/M2

## 2025-02-10 DIAGNOSIS — I89.0 LYMPH EDEMA: ICD-10-CM

## 2025-02-10 DIAGNOSIS — E66.9 OBESITY (BMI 30-39.9): ICD-10-CM

## 2025-02-10 DIAGNOSIS — R73.03 PREDIABETES: ICD-10-CM

## 2025-02-10 DIAGNOSIS — I87.321 IDIOPATHIC CHRONIC VENOUS HYPERTENSION OF RIGHT LEG WITH INFLAMMATION: ICD-10-CM

## 2025-02-10 DIAGNOSIS — E78.9 BORDERLINE HIGH CHOLESTEROL: Primary | ICD-10-CM

## 2025-02-10 PROCEDURE — 3008F BODY MASS INDEX DOCD: CPT | Performed by: INTERNAL MEDICINE

## 2025-02-10 PROCEDURE — 99213 OFFICE O/P EST LOW 20 MIN: CPT | Performed by: INTERNAL MEDICINE

## 2025-02-10 PROCEDURE — 1036F TOBACCO NON-USER: CPT | Performed by: INTERNAL MEDICINE

## 2025-02-10 RX ORDER — TIRZEPATIDE 5 MG/.5ML
5 INJECTION, SOLUTION SUBCUTANEOUS
COMMUNITY
Start: 2025-01-14

## 2025-02-10 ASSESSMENT — LIFESTYLE VARIABLES
AUDIT-C TOTAL SCORE: 0
HOW OFTEN DO YOU HAVE A DRINK CONTAINING ALCOHOL: NEVER
HOW MANY STANDARD DRINKS CONTAINING ALCOHOL DO YOU HAVE ON A TYPICAL DAY: PATIENT DOES NOT DRINK
HOW OFTEN DO YOU HAVE SIX OR MORE DRINKS ON ONE OCCASION: NEVER
HAVE YOU OR SOMEONE ELSE BEEN INJURED AS A RESULT OF YOUR DRINKING: NO
HAS A RELATIVE, FRIEND, DOCTOR, OR ANOTHER HEALTH PROFESSIONAL EXPRESSED CONCERN ABOUT YOUR DRINKING OR SUGGESTED YOU CUT DOWN: NO
SKIP TO QUESTIONS 9-10: 1
AUDIT TOTAL SCORE: 0

## 2025-02-10 NOTE — PROGRESS NOTES
Assessment/Plan   41 years old male who has obesity and also has prediabetes has been tolerating his Zepbound subcu injections.  Side effects were reviewed and discussed.  The importance of continuing the lifestyle modification was discussed.    Patient's cholesterol numbers were reviewed and discussed and he will have a follow-up cholesterol report in about 3 months.    He was advised to continue the lymphedema therapy and also the stockings regularly.    Patient will follow-up with the PCP in 3 months and he will follow-up with me for his chronic leg swellings as already scheduled.          Problem List Items Addressed This Visit       Borderline high cholesterol - Primary    Relevant Orders    CBC and Auto Differential    Comprehensive Metabolic Panel    Lipid Panel    Obesity (BMI 30-39.9)    Idiopathic chronic venous hypertension of right leg with inflammation    Lymph edema    Prediabetes    Relevant Orders    CBC and Auto Differential       Subjective     Patient ID: Aviva Martinez is a 40 y.o. male who presents for Follow-up.    History of present illness  40 years old male who is generally active came for a follow-up visit after he was started on medications to lose weight.  After the insurance approval he started to take the Zepbound and he says he is tolerating it well.  He went to the endocrinologist and he has increased the dose to 0.5 mg/week.  He is very happy that he has lost more than 25 pounds.  He had seen the dietitian and he has modified his diet.  He is also exercising better.    He is feeling the right leg swelling also much improved and the legs are lighter.  His repeat hemoglobin A1c is 5.5 by fingerstick testings in the endocrinologist office.    He denies any chest pain or short of breath.    Rest of the review of systems no acute complaints.      Family History   Problem Relation Name Age of Onset    Glaucoma Mother      Hypertension Mother      No Known Problems Father        Social  History     Socioeconomic History    Marital status:      Spouse name: Not on file    Number of children: Not on file    Years of education: Not on file    Highest education level: Not on file   Occupational History    Not on file   Tobacco Use    Smoking status: Never     Passive exposure: Never    Smokeless tobacco: Never   Vaping Use    Vaping status: Never Used   Substance and Sexual Activity    Alcohol use: Never     Alcohol/week: 1.0 standard drink of alcohol     Types: 1 Cans of beer per week     Comment: rarely    Drug use: Never    Sexual activity: Not on file   Other Topics Concern    Not on file   Social History Narrative    Not on file     Social Drivers of Health     Financial Resource Strain: Not on file   Food Insecurity: Not on file   Transportation Needs: Not on file   Physical Activity: Not on file   Stress: Not on file   Social Connections: Not on file   Intimate Partner Violence: Not on file   Housing Stability: Not on file      Patient has no known allergies.   Current Outpatient Medications   Medication Sig Dispense Refill    albuterol 90 mcg/actuation inhaler Inhale 2 puffs every 6 hours if needed for wheezing (Cough) for up to 25 days. 18 g 0    fluticasone (Flonase) 50 mcg/actuation nasal spray Administer 2 sprays into affected nostril(s) once daily.      omeprazole (PriLOSEC) 40 mg DR capsule Take 1 capsule (40 mg) by mouth once daily in the morning. Take before meals. Do not crush or chew. 30 capsule 1    Zepbound 5 mg/0.5 mL injection Inject 5 mg under the skin every 7 days.       No current facility-administered medications for this visit.        Objective     Vitals:    02/10/25 1347   BP: 116/81   Pulse: 92        Physical Exam  Normal-built, well-nourished with no apparent distress. Alert oriented  Skin:  Normal turgor.  No rash.  Head:  Normocephalic, atraumatic.  Eyes:  Pupils are equal, round,.  No pallor of conjunctivae.  Mucous membranes are moist  Neck:  Supple.  No JVD.   Delete  No clubbing  Chest:  Vesicular breathing. Bilaterally good air entry.  No wheezing.  No crackles.  Heart:  Regular rate and rhythm.  S1, S2 positive.  No murmur.  Abdomen:  Soft and nontender.  Bowel sounds are positive.  No organomegaly.  Extremities:  Unilateral right leg lymphedema but improving.  Patient has knee-high graduated compression stockings.  Bilaterally 2+ dorsalis pedis pulses.  No calf tenderness. Homans sign is negative.  Neuro Exam: No focal signs. Gait is normal.      Problem List Items Addressed This Visit       Borderline high cholesterol - Primary    Relevant Orders    CBC and Auto Differential    Comprehensive Metabolic Panel    Lipid Panel    Obesity (BMI 30-39.9)    Idiopathic chronic venous hypertension of right leg with inflammation    Lymph edema    Prediabetes    Relevant Orders    CBC and Auto Differential        Orders Placed This Encounter   Procedures    CBC and Auto Differential     Standing Status:   Future     Standing Expiration Date:   8/10/2025     Order Specific Question:   Release result to [employer]     Answer:   Immediate    Comprehensive Metabolic Panel     Standing Status:   Future     Standing Expiration Date:   6/10/2025     Order Specific Question:   Release result to [employer]     Answer:   Immediate    Lipid Panel     fasting     Standing Status:   Future     Standing Expiration Date:   8/10/2025     Order Specific Question:   Release result to [employer]     Answer:   Immediate [1]        Lab Results   Component Value Date    WBC 4.0 (L) 12/24/2024    HGB 14.2 12/24/2024    HCT 41.3 12/24/2024     12/24/2024    CHOL 204 (H) 12/24/2024    TRIG 93 12/24/2024    HDL 50.0 12/24/2024    ALT 21 12/24/2024    AST 23 12/24/2024     12/24/2024    K 4.2 12/24/2024     12/24/2024    CREATININE 1.12 12/24/2024    BUN 11 12/24/2024    CO2 28 12/24/2024    TSH 2.05 12/24/2024    HGBA1C 5.5 01/14/2025     Lab Results   Component Value Date    CHOL 204 (H)  12/24/2024    LDLCALC 135 (H) 12/24/2024    CHHDL 4.1 12/24/2024       No results found.

## 2025-03-10 DIAGNOSIS — E78.9 BORDERLINE HIGH CHOLESTEROL: ICD-10-CM

## 2025-03-10 DIAGNOSIS — R73.03 PREDIABETES: ICD-10-CM

## 2025-03-12 LAB
ALBUMIN SERPL-MCNC: 4.2 G/DL (ref 3.6–5.1)
ALP SERPL-CCNC: 52 U/L (ref 36–130)
ALT SERPL-CCNC: 14 U/L (ref 9–46)
ANION GAP SERPL CALCULATED.4IONS-SCNC: 9 MMOL/L (CALC) (ref 7–17)
AST SERPL-CCNC: 25 U/L (ref 10–40)
BASOPHILS # BLD AUTO: 19 CELLS/UL (ref 0–200)
BASOPHILS NFR BLD AUTO: 0.6 %
BILIRUB SERPL-MCNC: 0.6 MG/DL (ref 0.2–1.2)
BUN SERPL-MCNC: 15 MG/DL (ref 7–25)
CALCIUM SERPL-MCNC: 9.5 MG/DL (ref 8.6–10.3)
CHLORIDE SERPL-SCNC: 103 MMOL/L (ref 98–110)
CHOLEST SERPL-MCNC: 167 MG/DL
CHOLEST/HDLC SERPL: 3.3 (CALC)
CO2 SERPL-SCNC: 28 MMOL/L (ref 20–32)
CREAT SERPL-MCNC: 1.01 MG/DL (ref 0.6–1.29)
EGFRCR SERPLBLD CKD-EPI 2021: 95 ML/MIN/1.73M2
EOSINOPHIL # BLD AUTO: 19 CELLS/UL (ref 15–500)
EOSINOPHIL NFR BLD AUTO: 0.6 %
ERYTHROCYTE [DISTWIDTH] IN BLOOD BY AUTOMATED COUNT: 14 % (ref 11–15)
GLUCOSE SERPL-MCNC: 73 MG/DL (ref 65–99)
HCT VFR BLD AUTO: 43.3 % (ref 38.5–50)
HDLC SERPL-MCNC: 50 MG/DL
HGB BLD-MCNC: 14 G/DL (ref 13.2–17.1)
LDLC SERPL CALC-MCNC: 105 MG/DL (CALC)
LYMPHOCYTES # BLD AUTO: 1248 CELLS/UL (ref 850–3900)
LYMPHOCYTES NFR BLD AUTO: 39 %
MCH RBC QN AUTO: 27.7 PG (ref 27–33)
MCHC RBC AUTO-ENTMCNC: 32.3 G/DL (ref 32–36)
MCV RBC AUTO: 85.6 FL (ref 80–100)
MONOCYTES # BLD AUTO: 294 CELLS/UL (ref 200–950)
MONOCYTES NFR BLD AUTO: 9.2 %
NEUTROPHILS # BLD AUTO: 1619 CELLS/UL (ref 1500–7800)
NEUTROPHILS NFR BLD AUTO: 50.6 %
NONHDLC SERPL-MCNC: 117 MG/DL (CALC)
PLATELET # BLD AUTO: 283 THOUSAND/UL (ref 140–400)
PMV BLD REES-ECKER: 9.7 FL (ref 7.5–12.5)
POTASSIUM SERPL-SCNC: 4.5 MMOL/L (ref 3.5–5.3)
PROT SERPL-MCNC: 6.7 G/DL (ref 6.1–8.1)
RBC # BLD AUTO: 5.06 MILLION/UL (ref 4.2–5.8)
SODIUM SERPL-SCNC: 140 MMOL/L (ref 135–146)
TRIGL SERPL-MCNC: 42 MG/DL
WBC # BLD AUTO: 3.2 THOUSAND/UL (ref 3.8–10.8)

## 2025-05-13 ENCOUNTER — APPOINTMENT (OUTPATIENT)
Dept: PRIMARY CARE | Facility: CLINIC | Age: 40
End: 2025-05-13
Payer: COMMERCIAL

## 2025-05-22 ENCOUNTER — OFFICE VISIT (OUTPATIENT)
Dept: PRIMARY CARE | Facility: CLINIC | Age: 40
End: 2025-05-22
Payer: COMMERCIAL

## 2025-05-22 DIAGNOSIS — I89.0 LYMPH EDEMA: ICD-10-CM

## 2025-05-22 DIAGNOSIS — I87.321 IDIOPATHIC CHRONIC VENOUS HYPERTENSION OF RIGHT LEG WITH INFLAMMATION: Primary | ICD-10-CM

## 2025-05-22 DIAGNOSIS — R60.0 EDEMA OF RIGHT LOWER EXTREMITY: ICD-10-CM

## 2025-05-22 PROCEDURE — 93971 EXTREMITY STUDY: CPT | Performed by: INTERNAL MEDICINE

## 2025-05-22 NOTE — PROGRESS NOTES
Venous Duplex      Indications:  Limb pain  Leg Edema    Sonographer: Aicha Gibbons RVT    TECHNIQUE:  Venous Duplex ultrasound spectral Doppler wave modality was performed with the patient in the supine and upright positions to determine the status of the deep and superficial systems of the right lower extremity. The common femoral, femoral and popliteal veins of the deep venous system were imaged. The superficial system was imaged entirely to include, but was not limited to, the saphenous-femoral junction (SFJ) down the greater saphenous vein from the groin to the ankle, and the saphenous-popliteal junction (SPJ), if present, at the popliteal fossa down the small saphenous vein (SSV) to the ankle. As indicated, the cranial extensions of the SSV (CESSV), the anterior accessory GSV (AAGSV), and the posterior accessory GSV (PAGSV) were also evaluated, if present. All areas meeting the criteria for venous reflux (500 milliseconds or greater in the saphenous veins and principle branches, 350 milliseconds in perforators and 1000 milliseconds in the deep system) were confirmed with spectral Doppler analysis and confirmatory images were documented:     FINDINGS ARE THE FOLLOWING:    RIGHT LEG:  There is no evidence of thrombus in the interrogated segments of the deep or superficial venous system. There is no evidence of deep venous reflux.     GSV dimensions: 4.0mm junction   proximal segment is non compressible   mid segment is non compressible   distal segment is non compressible  The Greater Saphenous Vein is non compressible, consistent with previous treatment    SSV dimensions: 2.6mm junction  2.3mm mid  There is <0.5 seconds of reflux in the Small Saphenous Vein    There are multiple incompetent tributaries along the thigh and lower leg.    Trib1 dimensions: 1.7mm  Trib2 dimensions: 1.3mm  There is >0.5 seconds of reflux in the tributaries     RIGHT LEG CONCLUSION:      The right leg venous duplex interrogation  shows that there is no evidence of deep vein thrombosis noted and no evidence superficial vein thrombosis noted.     There is no evidence of deep venous reflux in right leg     The patient’s right greater saphenous vein appears closed from previous procedure.      The patient’s right Anterior Accessory greater saphenous vein  has no reflux.     The patient’s right small saphenous vein has no reflux.     There are multiple dilated torturous refluxing tributaries noted in the right upper ,  middle , and lower leg, measured ranging from 1.7 to 1.3  mm in diameter.

## 2025-06-09 ENCOUNTER — APPOINTMENT (OUTPATIENT)
Dept: PRIMARY CARE | Facility: CLINIC | Age: 40
End: 2025-06-09
Payer: COMMERCIAL

## 2025-06-16 ENCOUNTER — APPOINTMENT (OUTPATIENT)
Dept: PRIMARY CARE | Facility: CLINIC | Age: 40
End: 2025-06-16
Payer: COMMERCIAL

## 2025-06-16 VITALS
WEIGHT: 231 LBS | DIASTOLIC BLOOD PRESSURE: 81 MMHG | HEART RATE: 70 BPM | HEIGHT: 71 IN | SYSTOLIC BLOOD PRESSURE: 133 MMHG | BODY MASS INDEX: 32.34 KG/M2

## 2025-06-16 DIAGNOSIS — R60.0 EDEMA OF RIGHT LOWER EXTREMITY: ICD-10-CM

## 2025-06-16 DIAGNOSIS — I87.321 IDIOPATHIC CHRONIC VENOUS HYPERTENSION OF RIGHT LEG WITH INFLAMMATION: Primary | ICD-10-CM

## 2025-06-16 DIAGNOSIS — I89.0 LYMPH EDEMA: ICD-10-CM

## 2025-06-16 PROCEDURE — 1036F TOBACCO NON-USER: CPT | Performed by: INTERNAL MEDICINE

## 2025-06-16 PROCEDURE — 99213 OFFICE O/P EST LOW 20 MIN: CPT | Performed by: INTERNAL MEDICINE

## 2025-06-16 PROCEDURE — 3008F BODY MASS INDEX DOCD: CPT | Performed by: INTERNAL MEDICINE

## 2025-06-16 RX ORDER — TIRZEPATIDE 7.5 MG/.5ML
7.5 INJECTION, SOLUTION SUBCUTANEOUS
COMMUNITY
Start: 2025-06-04

## 2025-06-16 ASSESSMENT — LIFESTYLE VARIABLES
HAS A RELATIVE, FRIEND, DOCTOR, OR ANOTHER HEALTH PROFESSIONAL EXPRESSED CONCERN ABOUT YOUR DRINKING OR SUGGESTED YOU CUT DOWN: NO
HOW MANY STANDARD DRINKS CONTAINING ALCOHOL DO YOU HAVE ON A TYPICAL DAY: 1 OR 2
SKIP TO QUESTIONS 9-10: 1
HOW OFTEN DO YOU HAVE A DRINK CONTAINING ALCOHOL: MONTHLY OR LESS
AUDIT TOTAL SCORE: 1
HOW OFTEN DO YOU HAVE SIX OR MORE DRINKS ON ONE OCCASION: NEVER
AUDIT-C TOTAL SCORE: 1
HAVE YOU OR SOMEONE ELSE BEEN INJURED AS A RESULT OF YOUR DRINKING: NO

## 2025-06-16 ASSESSMENT — PATIENT HEALTH QUESTIONNAIRE - PHQ9
SUM OF ALL RESPONSES TO PHQ9 QUESTIONS 1 AND 2: 0
2. FEELING DOWN, DEPRESSED OR HOPELESS: NOT AT ALL
1. LITTLE INTEREST OR PLEASURE IN DOING THINGS: NOT AT ALL

## 2025-06-16 NOTE — PROGRESS NOTES
"  Chief Complaints:  Seen for follow-up regarding his chronic right leg swelling    HPI:  History of Present Illness  Aviva Martinez is a 40 year old male with lymphedema who presents for follow-up regarding leg swelling.    40 years old male who has had chronic right leg swelling and was diagnosed with lymphedema and the workup for secondary causes such as filariasis is negative.  Patient found to have right leg chronic venous insufficiency and was treated with endovenous laser ablation and with image guided foam sclerotherapy and tolerated it very well.    He has experienced significant improvement in right leg swelling over the past year.  He manages his condition with compression socks, a lymphedema pump, and lymphedema massage. Recently, he noted increased tightness in his leg after a day at an amusement park without wearing compression socks, attributing it to prolonged standing and walking in warm weather.    He has lost approximately thirty pounds, which has positively impacted his condition. He has been under the care of his current doctor for about two and a half to three years.    He is scheduled for a follow-up with an endocrinologist in August. He underwent body scans in January and more recently, which assessed body fat and other parameters.    There is no family history of lymphedema. He engages in activities such as swimming and baseball during the summer.         Medications Ordered Prior to Encounter[1]     RX Allergies[2]     Examination:    Visit Vitals  /81 (BP Location: Right arm, Patient Position: Sitting, BP Cuff Size: Adult)   Pulse 70   Ht 1.803 m (5' 11\")   Wt 105 kg (231 lb)   BMI 32.22 kg/m²   Smoking Status Never   BSA 2.29 m²        Normal-built, well-nourished  with no apparent distress. Alert oriented  Skin:  Normal turgor.  No rash.  Head:  Normocephalic, atraumatic.  Eyes:  Pupils are equal, round,.  No pallor of conjunctivae.   Neck:  Supple.  No JVD.  No carotid bruit.   No " clubbing  Chest:  Vesicular breathing. Bilaterally good air entry.  No wheezing.  No crackles.  Heart:  Regular rate and rhythm.  S1, S2 positive.  No murmur.  Abdomen:  Soft and nontender.  Bowel sounds are positive.  No organomegaly.  Extremities: Has no visible varicosities.  Stemmer's sign is positive in the right foot.  Detailed leg exam below.  Bilaterally 2+ dorsalis pedis pulses.  No calf tenderness. Homans sign is negative.  Neuro Exam: No focal signs. Gait is normal.     Venous Exam:  Varicose veins in left calf absent  Varicose veins in left thigh absent  Varicose veins in right calf absent  Varicose veins in right thigh absent  Reticular veins in left calf absent  Reticular veins in left thigh absent  Reticular veins in right calf absent  Reticular veins in right thigh absent  Telangiectasias in left calf absent  Telangiectasias in left thigh absent  Telangiectasias in right calf absent  Telangiectasias in right thigh absent  Right leg trace edema present but has 1+ edema on the foot.  Left leg edema absent  Hyperpigmentation in left leg absent  Hyperpigmentation in right leg absent  Lipodermatosclerosis in right leg absent  Lipodermatosclerosis in left leg absent  Dermatitis in left leg absent  Dermatitis in right leg absent  Corona phlebectatica in left leg absent).  Corona phlebectatica in right leg absent  Atrophe carmen in left leg absent  Atrophe carmen in right leg absent  Ulcer(s) in left leg absent  Ulcer(s) in right leg absent     CEAP Classification  Right leg CEAP C 3S Ep  Left leg CEAP C 1A       Diagnosis/ Problems    Assessment & Plan  Lymphedema  Chronic lymphedema of the foot, exacerbated by heat and activity. Managed with compression stockings, massage, and pump. Long-term management required to prevent complications.  - Continue regular use of compression stockings.  - Use lymphedema pump before and after prolonged activity.  - Perform regular lymphedema massage.  - Consider ankle  compression socks for additional support.  - Follow-up in one year for vein assessment, contact sooner if needed.    Venous Insufficiency  Right greater saphenous vein treatment effective. Minimal venous issues remain, with a small 1.7 mm branch not requiring intervention. Focus on lymphedema management.  - Continue wearing compression stockings.  - Monitor small venous branch for changes but the preventive care was discussed in detail.  - Follow-up in one year for vein assessment, contact sooner if needed.    General Health Maintenance  Successful 30-pound weight loss, indicating effective weight management.  - Continue current weight management strategies and healthy lifestyle.       Assessment/Plan :  Problem List Items Addressed This Visit       Edema of right lower extremity    Idiopathic chronic venous hypertension of right leg with inflammation - Primary    Lymph edema       No orders of the defined types were placed in this encounter.        This medical note was created with the assistance of artificial intelligence (AI) for documentation purposes. The content has been reviewed and confirmed by the healthcare provider for accuracy and completeness. Patient consented to the use of audio recording and use of AI during their visit.             [1]   Current Outpatient Medications on File Prior to Visit   Medication Sig Dispense Refill    albuterol 90 mcg/actuation inhaler Inhale 2 puffs every 6 hours if needed for wheezing (Cough) for up to 25 days. 18 g 0    fluticasone (Flonase) 50 mcg/actuation nasal spray Administer 2 sprays into affected nostril(s) once daily.      Zepbound 7.5 mg/0.5 mL injection Inject 7.5 mg under the skin every 7 days.      [DISCONTINUED] omeprazole (PriLOSEC) 40 mg DR capsule Take 1 capsule (40 mg) by mouth once daily in the morning. Take before meals. Do not crush or chew. 30 capsule 1    [DISCONTINUED] Zepbound 5 mg/0.5 mL injection Inject 5 mg under the skin every 7 days.       No  current facility-administered medications on file prior to visit.   [2] No Known Allergies

## 2026-06-15 ENCOUNTER — APPOINTMENT (OUTPATIENT)
Dept: PRIMARY CARE | Facility: CLINIC | Age: 41
End: 2026-06-15
Payer: COMMERCIAL